# Patient Record
Sex: MALE | Race: WHITE | NOT HISPANIC OR LATINO | Employment: FULL TIME | URBAN - METROPOLITAN AREA
[De-identification: names, ages, dates, MRNs, and addresses within clinical notes are randomized per-mention and may not be internally consistent; named-entity substitution may affect disease eponyms.]

---

## 2022-10-20 ENCOUNTER — TELEPHONE (OUTPATIENT)
Dept: EMERGENCY MEDICINE | Facility: OTHER | Age: 42
End: 2022-10-20
Payer: COMMERCIAL

## 2022-10-20 ENCOUNTER — HOSPITAL ENCOUNTER (EMERGENCY)
Facility: OTHER | Age: 42
Discharge: HOME | End: 2022-10-20
Attending: EMERGENCY MEDICINE
Payer: COMMERCIAL

## 2022-10-20 VITALS
DIASTOLIC BLOOD PRESSURE: 79 MMHG | WEIGHT: 200 LBS | BODY MASS INDEX: 26.51 KG/M2 | HEART RATE: 64 BPM | OXYGEN SATURATION: 96 % | SYSTOLIC BLOOD PRESSURE: 134 MMHG | HEIGHT: 73 IN | RESPIRATION RATE: 18 BRPM | TEMPERATURE: 98 F

## 2022-10-20 DIAGNOSIS — R55 SYNCOPE: ICD-10-CM

## 2022-10-20 DIAGNOSIS — R11.2 NAUSEA AND VOMITING, UNSPECIFIED VOMITING TYPE: ICD-10-CM

## 2022-10-20 DIAGNOSIS — R10.31 RIGHT LOWER QUADRANT ABDOMINAL PAIN: Primary | ICD-10-CM

## 2022-10-20 LAB
ALBUMIN SERPL BCP-MCNC: 4.1 G/DL (ref 3.5–5.2)
ALP SERPL-CCNC: 69 U/L (ref 55–135)
ALT SERPL W/O P-5'-P-CCNC: 43 U/L (ref 10–44)
ANION GAP SERPL CALC-SCNC: 6 MMOL/L (ref 8–16)
AST SERPL-CCNC: 30 U/L (ref 10–40)
BASOPHILS # BLD AUTO: 0.03 K/UL (ref 0–0.2)
BASOPHILS NFR BLD: 0.5 % (ref 0–1.9)
BILIRUB SERPL-MCNC: 0.6 MG/DL (ref 0.1–1)
BILIRUB UR QL STRIP: NEGATIVE
BUN SERPL-MCNC: 17 MG/DL (ref 6–20)
CALCIUM SERPL-MCNC: 9 MG/DL (ref 8.7–10.5)
CHLORIDE SERPL-SCNC: 106 MMOL/L (ref 95–110)
CLARITY UR: CLEAR
CO2 SERPL-SCNC: 28 MMOL/L (ref 23–29)
COLOR UR: YELLOW
CREAT SERPL-MCNC: 1.2 MG/DL (ref 0.5–1.4)
DIFFERENTIAL METHOD: ABNORMAL
EOSINOPHIL # BLD AUTO: 0.1 K/UL (ref 0–0.5)
EOSINOPHIL NFR BLD: 1.8 % (ref 0–8)
ERYTHROCYTE [DISTWIDTH] IN BLOOD BY AUTOMATED COUNT: 12.2 % (ref 11.5–14.5)
EST. GFR  (NO RACE VARIABLE): >60 ML/MIN/1.73 M^2
GLUCOSE SERPL-MCNC: 128 MG/DL (ref 70–110)
GLUCOSE UR QL STRIP: NEGATIVE
HCT VFR BLD AUTO: 47.7 % (ref 40–54)
HGB BLD-MCNC: 16.3 G/DL (ref 14–18)
HGB UR QL STRIP: ABNORMAL
IMM GRANULOCYTES # BLD AUTO: 0.01 K/UL (ref 0–0.04)
IMM GRANULOCYTES NFR BLD AUTO: 0.2 % (ref 0–0.5)
KETONES UR QL STRIP: NEGATIVE
LEUKOCYTE ESTERASE UR QL STRIP: NEGATIVE
LIPASE SERPL-CCNC: 29 U/L (ref 4–60)
LYMPHOCYTES # BLD AUTO: 2.8 K/UL (ref 1–4.8)
LYMPHOCYTES NFR BLD: 43.6 % (ref 18–48)
MCH RBC QN AUTO: 30.6 PG (ref 27–31)
MCHC RBC AUTO-ENTMCNC: 34.2 G/DL (ref 32–36)
MCV RBC AUTO: 90 FL (ref 82–98)
MONOCYTES # BLD AUTO: 0.7 K/UL (ref 0.3–1)
MONOCYTES NFR BLD: 10.1 % (ref 4–15)
NEUTROPHILS # BLD AUTO: 2.9 K/UL (ref 1.8–7.7)
NEUTROPHILS NFR BLD: 43.8 % (ref 38–73)
NITRITE UR QL STRIP: NEGATIVE
NRBC BLD-RTO: 0 /100 WBC
PH UR STRIP: 7 [PH] (ref 5–8)
PLATELET # BLD AUTO: 283 K/UL (ref 150–450)
PMV BLD AUTO: 8.7 FL (ref 9.2–12.9)
POTASSIUM SERPL-SCNC: 3.7 MMOL/L (ref 3.5–5.1)
PROT SERPL-MCNC: 6.9 G/DL (ref 6–8.4)
PROT UR QL STRIP: NEGATIVE
RBC # BLD AUTO: 5.32 M/UL (ref 4.6–6.2)
SODIUM SERPL-SCNC: 140 MMOL/L (ref 136–145)
SP GR UR STRIP: 1.02 (ref 1–1.03)
URN SPEC COLLECT METH UR: ABNORMAL
UROBILINOGEN UR STRIP-ACNC: NEGATIVE EU/DL
WBC # BLD AUTO: 6.52 K/UL (ref 3.9–12.7)

## 2022-10-20 PROCEDURE — 96375 TX/PRO/DX INJ NEW DRUG ADDON: CPT

## 2022-10-20 PROCEDURE — 80053 COMPREHEN METABOLIC PANEL: CPT | Performed by: EMERGENCY MEDICINE

## 2022-10-20 PROCEDURE — 93010 EKG 12-LEAD: ICD-10-PCS | Mod: ,,, | Performed by: INTERNAL MEDICINE

## 2022-10-20 PROCEDURE — 25000003 PHARM REV CODE 250: Performed by: EMERGENCY MEDICINE

## 2022-10-20 PROCEDURE — 85025 COMPLETE CBC W/AUTO DIFF WBC: CPT | Performed by: EMERGENCY MEDICINE

## 2022-10-20 PROCEDURE — 93010 ELECTROCARDIOGRAM REPORT: CPT | Mod: ,,, | Performed by: INTERNAL MEDICINE

## 2022-10-20 PROCEDURE — 81003 URINALYSIS AUTO W/O SCOPE: CPT | Performed by: EMERGENCY MEDICINE

## 2022-10-20 PROCEDURE — 96374 THER/PROPH/DIAG INJ IV PUSH: CPT

## 2022-10-20 PROCEDURE — 99285 EMERGENCY DEPT VISIT HI MDM: CPT | Mod: 25

## 2022-10-20 PROCEDURE — 96361 HYDRATE IV INFUSION ADD-ON: CPT

## 2022-10-20 PROCEDURE — 63600175 PHARM REV CODE 636 W HCPCS: Performed by: EMERGENCY MEDICINE

## 2022-10-20 PROCEDURE — 93005 ELECTROCARDIOGRAM TRACING: CPT

## 2022-10-20 PROCEDURE — 83690 ASSAY OF LIPASE: CPT | Performed by: EMERGENCY MEDICINE

## 2022-10-20 RX ORDER — DICYCLOMINE HYDROCHLORIDE 10 MG/1
20 CAPSULE ORAL
Status: COMPLETED | OUTPATIENT
Start: 2022-10-20 | End: 2022-10-20

## 2022-10-20 RX ORDER — POLYETHYLENE GLYCOL 3350 17 G/17G
17 POWDER, FOR SOLUTION ORAL DAILY
Qty: 10 EACH | Refills: 0 | Status: SHIPPED | OUTPATIENT
Start: 2022-10-20 | End: 2024-02-14

## 2022-10-20 RX ORDER — DICYCLOMINE HYDROCHLORIDE 20 MG/1
20 TABLET ORAL 2 TIMES DAILY
Qty: 20 TABLET | Refills: 0 | Status: SHIPPED | OUTPATIENT
Start: 2022-10-20 | End: 2022-11-19

## 2022-10-20 RX ORDER — ONDANSETRON 4 MG/1
4 TABLET, FILM COATED ORAL EVERY 6 HOURS
Qty: 12 TABLET | Refills: 0 | Status: SHIPPED | OUTPATIENT
Start: 2022-10-20 | End: 2024-02-14

## 2022-10-20 RX ORDER — POLYETHYLENE GLYCOL 3350 17 G/17G
17 POWDER, FOR SOLUTION ORAL DAILY
Qty: 116 EACH | Refills: 0 | Status: SHIPPED | OUTPATIENT
Start: 2022-10-20 | End: 2024-02-14

## 2022-10-20 RX ORDER — MORPHINE SULFATE 2 MG/ML
2 INJECTION, SOLUTION INTRAMUSCULAR; INTRAVENOUS
Status: COMPLETED | OUTPATIENT
Start: 2022-10-20 | End: 2022-10-20

## 2022-10-20 RX ORDER — LIDOCAINE HYDROCHLORIDE 20 MG/ML
15 SOLUTION OROPHARYNGEAL ONCE
Status: COMPLETED | OUTPATIENT
Start: 2022-10-20 | End: 2022-10-20

## 2022-10-20 RX ORDER — ONDANSETRON 2 MG/ML
4 INJECTION INTRAMUSCULAR; INTRAVENOUS
Status: COMPLETED | OUTPATIENT
Start: 2022-10-20 | End: 2022-10-20

## 2022-10-20 RX ORDER — MAG HYDROX/ALUMINUM HYD/SIMETH 200-200-20
30 SUSPENSION, ORAL (FINAL DOSE FORM) ORAL ONCE
Status: COMPLETED | OUTPATIENT
Start: 2022-10-20 | End: 2022-10-20

## 2022-10-20 RX ADMIN — DICYCLOMINE HYDROCHLORIDE 20 MG: 10 CAPSULE ORAL at 06:10

## 2022-10-20 RX ADMIN — MORPHINE SULFATE 2 MG: 2 INJECTION, SOLUTION INTRAMUSCULAR; INTRAVENOUS at 05:10

## 2022-10-20 RX ADMIN — ONDANSETRON 4 MG: 2 INJECTION INTRAMUSCULAR; INTRAVENOUS at 05:10

## 2022-10-20 RX ADMIN — ALUMINUM HYDROXIDE, MAGNESIUM HYDROXIDE, AND SIMETHICONE 30 ML: 200; 200; 20 SUSPENSION ORAL at 06:10

## 2022-10-20 RX ADMIN — LIDOCAINE HYDROCHLORIDE 15 ML: 20 SOLUTION ORAL; TOPICAL at 06:10

## 2022-10-20 RX ADMIN — SODIUM CHLORIDE, SODIUM LACTATE, POTASSIUM CHLORIDE, AND CALCIUM CHLORIDE 1000 ML: .6; .31; .03; .02 INJECTION, SOLUTION INTRAVENOUS at 05:10

## 2022-10-20 NOTE — ED PROVIDER NOTES
Encounter Date: 10/20/2022       History     Chief Complaint   Patient presents with    Abdominal Pain     C/o right sided abdominal pain with nausea followed by a syncopal episode        Abdominal Pain  The current episode started just prior to arrival. The onset of the illness was abrupt. The problem has been gradually improving. The abdominal pain is located in the RLQ. The abdominal pain does not radiate. The abdominal pain is relieved by vomiting. The other symptoms of the illness include nausea and vomiting. The other symptoms of the illness do not include fever, diarrhea or dysuria.   The emesis contains stomach contents.   Symptoms associated with the illness do not include urgency, hematuria or back pain.   Review of patient's allergies indicates:   Allergen Reactions    Amoxicillin     Ampicillin     Penicillins      History reviewed. No pertinent past medical history.  History reviewed. No pertinent surgical history.  History reviewed. No pertinent family history.  Social History     Tobacco Use    Smoking status: Never    Smokeless tobacco: Never   Substance Use Topics    Alcohol use: Yes    Drug use: Never     Review of Systems   Constitutional:  Negative for fever.   Gastrointestinal:  Positive for abdominal pain, nausea and vomiting. Negative for diarrhea.   Genitourinary:  Negative for dysuria, hematuria and urgency.   Musculoskeletal:  Negative for back pain.   All other systems reviewed and are negative.    Physical Exam     Initial Vitals   BP Pulse Resp Temp SpO2   10/20/22 0531 10/20/22 0531 10/20/22 0530 10/20/22 0602 10/20/22 0530   118/71 (!) 54 15 98.2 °F (36.8 °C) 96 %      MAP       --                Physical Exam    Nursing note and vitals reviewed.  Constitutional: He appears well-developed and well-nourished.   HENT:   Head: Normocephalic.   Mouth/Throat: Oropharynx is clear and moist.   Eyes: Conjunctivae and EOM are normal.   Neck: Neck supple.   Normal range of  motion.  Cardiovascular:  Normal rate, regular rhythm and intact distal pulses.           Pulmonary/Chest: No respiratory distress.   Abdominal: Abdomen is soft. He exhibits no distension. There is no abdominal tenderness. There is no rebound and no guarding.   Musculoskeletal:         General: Normal range of motion.      Cervical back: Normal range of motion and neck supple.     Neurological: He is alert and oriented to person, place, and time. He has normal strength.   Skin: Skin is warm and dry. Capillary refill takes less than 2 seconds. No rash noted.   Psychiatric: He has a normal mood and affect.       ED Course   Procedures  Labs Reviewed   CBC W/ AUTO DIFFERENTIAL - Abnormal; Notable for the following components:       Result Value    MPV 8.7 (*)     All other components within normal limits   COMPREHENSIVE METABOLIC PANEL - Abnormal; Notable for the following components:    Glucose 128 (*)     Anion Gap 6 (*)     All other components within normal limits   URINALYSIS, REFLEX TO URINE CULTURE - Abnormal; Notable for the following components:    Occult Blood UA Trace (*)     All other components within normal limits    Narrative:     Specimen Source->Urine   LIPASE        ECG Results              EKG 12-lead (In process)  Result time 10/20/22 07:03:34      In process by Interface, Lab In [facility] (10/20/22 07:03:34)                   Narrative:    Test Reason : R55,    Vent. Rate : 058 BPM     Atrial Rate : 058 BPM     P-R Int : 158 ms          QRS Dur : 124 ms      QT Int : 424 ms       P-R-T Axes : 032 032 027 degrees     QTc Int : 416 ms    Sinus bradycardia  Right bundle branch block  Abnormal ECG      Referred By: [email or web address]   SELF           Confirmed By:                                   Imaging Results              CT Abdomen Pelvis  Without Contrast (Final result)  Result time 10/20/22 06:14:20      Final result by Francesco Nieves MD (10/20/22 06:14:20)                   Impression:      1. No  definite acute findings identified in the abdomen or pelvis by unenhanced CT technique to account for the patient's reported symptoms.  2. Additional details, as provided in the body of report.      Electronically signed by: Francesco Nieves  Date:    10/20/2022  Time:    06:14               Narrative:    EXAMINATION:  CT ABDOMEN PELVIS WITHOUT CONTRAST    CLINICAL HISTORY:  RLQ abdominal pain (Age >= 14y);    TECHNIQUE:  Low dose axial images, sagittal and coronal reformations were obtained from the lung bases to the pubic symphysis.    COMPARISON:  None    FINDINGS:  This examination is limited due to lack of intravenous contrast.    Lower chest: Minor dependent atelectasis at the lung bases.    Liver: Normal contour.    Gallbladder and bile ducts: Unremarkable.    Pancreas: Normal contour.    Spleen: Normal contour.    Adrenals: Normal contour.    Kidneys: Simple cyst lower pole right kidney.  Additional subcentimeter hypoattenuating lesions too small to characterize.    Lymph nodes: No abdominal or pelvic lymphadenopathy.    Bowel and mesentery: No evidence of bowel obstruction.  Normal caliber appendix.    Abdominal aorta: Unremarkable.    Inferior vena cava: Unremarkable.    Free fluid or free air: None.    Pelvis: Unremarkable.    Urinary bladder: Unremarkable.    Body wall: Small fat containing umbilical hernia.    Bones: Unremarkable.                                       Medications   lactated ringers bolus 1,000 mL (0 mLs Intravenous Stopped 10/20/22 0641)   ondansetron injection 4 mg (4 mg Intravenous Given 10/20/22 0549)   morphine injection 2 mg (2 mg Intravenous Given 10/20/22 0549)   aluminum-magnesium hydroxide-simethicone 200-200-20 mg/5 mL suspension 30 mL (30 mLs Oral Given 10/20/22 0630)     And   LIDOcaine HCl 2% oral solution 15 mL (15 mLs Oral Given 10/20/22 0630)   dicyclomine capsule 20 mg (20 mg Oral Given 10/20/22 0630)     Medical Decision Making:   Clinical Tests:   Lab Tests: Ordered and  Reviewed  Radiological Study: Ordered and Reviewed  ED Management:  Workup reassuring discussed symptomatic care and ED return precautions                         Clinical Impression:   Final diagnoses:  [R55] Syncope  [R10.31] Right lower quadrant abdominal pain (Primary)  [R11.2] Nausea and vomiting, unspecified vomiting type        ED Disposition Condition    Discharge Stable          ED Prescriptions       Medication Sig Dispense Start Date End Date Auth. Provider    ondansetron (ZOFRAN) 4 MG tablet Take 1 tablet (4 mg total) by mouth every 6 (six) hours. 12 tablet 10/20/2022 -- Guy J. Lefort, MD    dicyclomine (BENTYL) 20 mg tablet Take 1 tablet (20 mg total) by mouth 2 (two) times daily. 20 tablet 10/20/2022 11/19/2022 Guy J. Lefort, MD    polyethylene glycol (GLYCOLAX) 17 gram/dose powder Take 17 g by mouth once daily. 116 each 10/20/2022 -- Guy J. Lefort, MD          Follow-up Information       Follow up With Specialties Details Why Contact Info    [facility] - Emergency Dept Emergency Medicine  If symptoms worsen or any other concerns [address]  [facility] [address] [phone]             Guy J. Lefort, MD  10/20/22 3390

## 2023-06-15 ENCOUNTER — TELEPHONE (OUTPATIENT)
Dept: OTOLARYNGOLOGY | Facility: CLINIC | Age: 43
End: 2023-06-15
Payer: COMMERCIAL

## 2023-06-21 ENCOUNTER — OFFICE VISIT (OUTPATIENT)
Dept: FAMILY MEDICINE | Facility: CLINIC | Age: 43
End: 2023-06-21
Payer: COMMERCIAL

## 2023-06-21 VITALS
OXYGEN SATURATION: 97 % | WEIGHT: 198.44 LBS | HEIGHT: 73 IN | SYSTOLIC BLOOD PRESSURE: 122 MMHG | BODY MASS INDEX: 26.3 KG/M2 | HEART RATE: 63 BPM | DIASTOLIC BLOOD PRESSURE: 86 MMHG

## 2023-06-21 DIAGNOSIS — Z11.59 ENCOUNTER FOR HEPATITIS C SCREENING TEST FOR LOW RISK PATIENT: ICD-10-CM

## 2023-06-21 DIAGNOSIS — Z11.4 SCREENING FOR HIV (HUMAN IMMUNODEFICIENCY VIRUS): ICD-10-CM

## 2023-06-21 DIAGNOSIS — Z02.89 ENCOUNTER FOR COMPLETION OF FORM WITH PATIENT: ICD-10-CM

## 2023-06-21 DIAGNOSIS — Z00.00 ANNUAL PHYSICAL EXAM: Primary | ICD-10-CM

## 2023-06-21 PROCEDURE — 3008F PR BODY MASS INDEX (BMI) DOCUMENTED: ICD-10-PCS | Mod: CPTII,S$GLB,, | Performed by: FAMILY MEDICINE

## 2023-06-21 PROCEDURE — 3008F BODY MASS INDEX DOCD: CPT | Mod: CPTII,S$GLB,, | Performed by: FAMILY MEDICINE

## 2023-06-21 PROCEDURE — 99386 PR PREVENTIVE VISIT,NEW,40-64: ICD-10-PCS | Mod: S$GLB,,, | Performed by: FAMILY MEDICINE

## 2023-06-21 PROCEDURE — 99386 PREV VISIT NEW AGE 40-64: CPT | Mod: S$GLB,,, | Performed by: FAMILY MEDICINE

## 2023-06-21 PROCEDURE — 3074F PR MOST RECENT SYSTOLIC BLOOD PRESSURE < 130 MM HG: ICD-10-PCS | Mod: CPTII,S$GLB,, | Performed by: FAMILY MEDICINE

## 2023-06-21 PROCEDURE — 3079F PR MOST RECENT DIASTOLIC BLOOD PRESSURE 80-89 MM HG: ICD-10-PCS | Mod: CPTII,S$GLB,, | Performed by: FAMILY MEDICINE

## 2023-06-21 PROCEDURE — 99999 PR PBB SHADOW E&M-EST. PATIENT-LVL III: CPT | Mod: PBBFAC,,, | Performed by: FAMILY MEDICINE

## 2023-06-21 PROCEDURE — 99999 PR PBB SHADOW E&M-EST. PATIENT-LVL III: ICD-10-PCS | Mod: PBBFAC,,, | Performed by: FAMILY MEDICINE

## 2023-06-21 PROCEDURE — 1159F MED LIST DOCD IN RCRD: CPT | Mod: CPTII,S$GLB,, | Performed by: FAMILY MEDICINE

## 2023-06-21 PROCEDURE — 3074F SYST BP LT 130 MM HG: CPT | Mod: CPTII,S$GLB,, | Performed by: FAMILY MEDICINE

## 2023-06-21 PROCEDURE — 1159F PR MEDICATION LIST DOCUMENTED IN MEDICAL RECORD: ICD-10-PCS | Mod: CPTII,S$GLB,, | Performed by: FAMILY MEDICINE

## 2023-06-21 PROCEDURE — 3079F DIAST BP 80-89 MM HG: CPT | Mod: CPTII,S$GLB,, | Performed by: FAMILY MEDICINE

## 2023-06-24 ENCOUNTER — LAB VISIT (OUTPATIENT)
Dept: LAB | Facility: HOSPITAL | Age: 43
End: 2023-06-24
Attending: FAMILY MEDICINE
Payer: COMMERCIAL

## 2023-06-24 DIAGNOSIS — Z11.4 SCREENING FOR HIV (HUMAN IMMUNODEFICIENCY VIRUS): ICD-10-CM

## 2023-06-24 DIAGNOSIS — Z00.00 ANNUAL PHYSICAL EXAM: ICD-10-CM

## 2023-06-24 DIAGNOSIS — Z11.59 ENCOUNTER FOR HEPATITIS C SCREENING TEST FOR LOW RISK PATIENT: ICD-10-CM

## 2023-06-24 LAB
ALBUMIN SERPL BCP-MCNC: 4.3 G/DL (ref 3.5–5.2)
ALP SERPL-CCNC: 59 U/L (ref 55–135)
ALT SERPL W/O P-5'-P-CCNC: 20 U/L (ref 10–44)
ANION GAP SERPL CALC-SCNC: 10 MMOL/L (ref 8–16)
AST SERPL-CCNC: 23 U/L (ref 10–40)
BASOPHILS # BLD AUTO: 0.05 K/UL (ref 0–0.2)
BASOPHILS NFR BLD: 1 % (ref 0–1.9)
BILIRUB SERPL-MCNC: 0.6 MG/DL (ref 0.1–1)
BUN SERPL-MCNC: 17 MG/DL (ref 6–20)
CALCIUM SERPL-MCNC: 9.4 MG/DL (ref 8.7–10.5)
CHLORIDE SERPL-SCNC: 108 MMOL/L (ref 95–110)
CHOLEST SERPL-MCNC: 140 MG/DL (ref 120–199)
CHOLEST/HDLC SERPL: 3.4 {RATIO} (ref 2–5)
CO2 SERPL-SCNC: 23 MMOL/L (ref 23–29)
CREAT SERPL-MCNC: 1 MG/DL (ref 0.5–1.4)
DIFFERENTIAL METHOD: ABNORMAL
EOSINOPHIL # BLD AUTO: 0.1 K/UL (ref 0–0.5)
EOSINOPHIL NFR BLD: 1.6 % (ref 0–8)
ERYTHROCYTE [DISTWIDTH] IN BLOOD BY AUTOMATED COUNT: 12.7 % (ref 11.5–14.5)
EST. GFR  (NO RACE VARIABLE): >60 ML/MIN/1.73 M^2
ESTIMATED AVG GLUCOSE: 100 MG/DL (ref 68–131)
GLUCOSE SERPL-MCNC: 92 MG/DL (ref 70–110)
HBA1C MFR BLD: 5.1 % (ref 4–5.6)
HCT VFR BLD AUTO: 47.7 % (ref 40–54)
HCV AB SERPL QL IA: NORMAL
HDLC SERPL-MCNC: 41 MG/DL (ref 40–75)
HDLC SERPL: 29.3 % (ref 20–50)
HGB BLD-MCNC: 16.4 G/DL (ref 14–18)
HIV 1+2 AB+HIV1 P24 AG SERPL QL IA: NORMAL
IMM GRANULOCYTES # BLD AUTO: 0.01 K/UL (ref 0–0.04)
IMM GRANULOCYTES NFR BLD AUTO: 0.2 % (ref 0–0.5)
LDLC SERPL CALC-MCNC: 80.8 MG/DL (ref 63–159)
LYMPHOCYTES # BLD AUTO: 1.6 K/UL (ref 1–4.8)
LYMPHOCYTES NFR BLD: 32.9 % (ref 18–48)
MCH RBC QN AUTO: 30.1 PG (ref 27–31)
MCHC RBC AUTO-ENTMCNC: 34.4 G/DL (ref 32–36)
MCV RBC AUTO: 88 FL (ref 82–98)
MONOCYTES # BLD AUTO: 0.6 K/UL (ref 0.3–1)
MONOCYTES NFR BLD: 11.2 % (ref 4–15)
NEUTROPHILS # BLD AUTO: 2.6 K/UL (ref 1.8–7.7)
NEUTROPHILS NFR BLD: 53.1 % (ref 38–73)
NONHDLC SERPL-MCNC: 99 MG/DL
NRBC BLD-RTO: 0 /100 WBC
PLATELET # BLD AUTO: 292 K/UL (ref 150–450)
PMV BLD AUTO: 8.7 FL (ref 9.2–12.9)
POTASSIUM SERPL-SCNC: 3.8 MMOL/L (ref 3.5–5.1)
PROT SERPL-MCNC: 7.1 G/DL (ref 6–8.4)
RBC # BLD AUTO: 5.45 M/UL (ref 4.6–6.2)
SODIUM SERPL-SCNC: 141 MMOL/L (ref 136–145)
TRIGL SERPL-MCNC: 91 MG/DL (ref 30–150)
TSH SERPL DL<=0.005 MIU/L-ACNC: 2.39 UIU/ML (ref 0.4–4)
WBC # BLD AUTO: 4.92 K/UL (ref 3.9–12.7)

## 2023-06-24 PROCEDURE — 36415 COLL VENOUS BLD VENIPUNCTURE: CPT | Performed by: FAMILY MEDICINE

## 2023-06-24 PROCEDURE — 83036 HEMOGLOBIN GLYCOSYLATED A1C: CPT | Performed by: FAMILY MEDICINE

## 2023-06-24 PROCEDURE — 86803 HEPATITIS C AB TEST: CPT | Performed by: FAMILY MEDICINE

## 2023-06-24 PROCEDURE — 85025 COMPLETE CBC W/AUTO DIFF WBC: CPT | Performed by: FAMILY MEDICINE

## 2023-06-24 PROCEDURE — 84443 ASSAY THYROID STIM HORMONE: CPT | Performed by: FAMILY MEDICINE

## 2023-06-24 PROCEDURE — 80061 LIPID PANEL: CPT | Performed by: FAMILY MEDICINE

## 2023-06-24 PROCEDURE — 80053 COMPREHEN METABOLIC PANEL: CPT | Performed by: FAMILY MEDICINE

## 2023-06-24 PROCEDURE — 87389 HIV-1 AG W/HIV-1&-2 AB AG IA: CPT | Performed by: FAMILY MEDICINE

## 2023-06-26 ENCOUNTER — TELEPHONE (OUTPATIENT)
Dept: DERMATOLOGY | Facility: CLINIC | Age: 43
End: 2023-06-26
Payer: COMMERCIAL

## 2023-06-26 NOTE — TELEPHONE ENCOUNTER
----- Message from Velvet Hernandez MA sent at 6/19/2023  5:02 PM CDT -----  Contact: @ [phone]`    ----- Message -----  From: Miriam Jenkins  Sent: 6/19/2023   3:45 PM CDT  To: [facility] Clinical Support Triage    Pt requesting a appointment for the following new pt/prescription refill //hair shampoo...Please call and adv @ [phone]`

## 2023-06-27 ENCOUNTER — OFFICE VISIT (OUTPATIENT)
Dept: DERMATOLOGY | Facility: CLINIC | Age: 43
End: 2023-06-27
Payer: COMMERCIAL

## 2023-06-27 DIAGNOSIS — L50.3 DERMATOGRAPHISM: Primary | ICD-10-CM

## 2023-06-27 DIAGNOSIS — L71.9 ROSACEA: ICD-10-CM

## 2023-06-27 DIAGNOSIS — L21.9 SEBORRHEIC DERMATITIS: ICD-10-CM

## 2023-06-27 DIAGNOSIS — L70.9 ACNE, UNSPECIFIED ACNE TYPE: ICD-10-CM

## 2023-06-27 DIAGNOSIS — L73.1 PSEUDOFOLLICULITIS BARBAE: ICD-10-CM

## 2023-06-27 PROCEDURE — 1159F MED LIST DOCD IN RCRD: CPT | Mod: CPTII,S$GLB,, | Performed by: STUDENT IN AN ORGANIZED HEALTH CARE EDUCATION/TRAINING PROGRAM

## 2023-06-27 PROCEDURE — 99999 PR PBB SHADOW E&M-EST. PATIENT-LVL II: ICD-10-PCS | Mod: PBBFAC,,, | Performed by: STUDENT IN AN ORGANIZED HEALTH CARE EDUCATION/TRAINING PROGRAM

## 2023-06-27 PROCEDURE — 1160F RVW MEDS BY RX/DR IN RCRD: CPT | Mod: CPTII,S$GLB,, | Performed by: STUDENT IN AN ORGANIZED HEALTH CARE EDUCATION/TRAINING PROGRAM

## 2023-06-27 PROCEDURE — 3044F HG A1C LEVEL LT 7.0%: CPT | Mod: CPTII,S$GLB,, | Performed by: STUDENT IN AN ORGANIZED HEALTH CARE EDUCATION/TRAINING PROGRAM

## 2023-06-27 PROCEDURE — 3044F PR MOST RECENT HEMOGLOBIN A1C LEVEL <7.0%: ICD-10-PCS | Mod: CPTII,S$GLB,, | Performed by: STUDENT IN AN ORGANIZED HEALTH CARE EDUCATION/TRAINING PROGRAM

## 2023-06-27 PROCEDURE — 99204 OFFICE O/P NEW MOD 45 MIN: CPT | Mod: S$GLB,,, | Performed by: STUDENT IN AN ORGANIZED HEALTH CARE EDUCATION/TRAINING PROGRAM

## 2023-06-27 PROCEDURE — 99204 PR OFFICE/OUTPT VISIT, NEW, LEVL IV, 45-59 MIN: ICD-10-PCS | Mod: S$GLB,,, | Performed by: STUDENT IN AN ORGANIZED HEALTH CARE EDUCATION/TRAINING PROGRAM

## 2023-06-27 PROCEDURE — 1160F PR REVIEW ALL MEDS BY PRESCRIBER/CLIN PHARMACIST DOCUMENTED: ICD-10-PCS | Mod: CPTII,S$GLB,, | Performed by: STUDENT IN AN ORGANIZED HEALTH CARE EDUCATION/TRAINING PROGRAM

## 2023-06-27 PROCEDURE — 1159F PR MEDICATION LIST DOCUMENTED IN MEDICAL RECORD: ICD-10-PCS | Mod: CPTII,S$GLB,, | Performed by: STUDENT IN AN ORGANIZED HEALTH CARE EDUCATION/TRAINING PROGRAM

## 2023-06-27 PROCEDURE — 99999 PR PBB SHADOW E&M-EST. PATIENT-LVL II: CPT | Mod: PBBFAC,,, | Performed by: STUDENT IN AN ORGANIZED HEALTH CARE EDUCATION/TRAINING PROGRAM

## 2023-06-27 RX ORDER — KETOCONAZOLE 20 MG/ML
SHAMPOO, SUSPENSION TOPICAL
Qty: 240 ML | Refills: 6 | Status: SHIPPED | OUTPATIENT
Start: 2023-06-28 | End: 2024-02-14

## 2023-06-27 RX ORDER — METRONIDAZOLE 7.5 MG/G
GEL TOPICAL 2 TIMES DAILY
Qty: 45 G | Refills: 6 | Status: SHIPPED | OUTPATIENT
Start: 2023-06-27 | End: 2023-12-21 | Stop reason: SDUPTHER

## 2023-06-27 RX ORDER — TRETINOIN AND BENZOYL PEROXIDE 30; 1 MG/G; MG/G
1 CREAM TOPICAL NIGHTLY
Qty: 30 G | Refills: 3 | Status: SHIPPED | OUTPATIENT
Start: 2023-06-27

## 2023-06-27 NOTE — PATIENT INSTRUCTIONS

## 2023-06-27 NOTE — PROGRESS NOTES
Subjective:      Patient ID:  Santy Angeles is a 43 y.o. male who presents for   Chief Complaint   Patient presents with    Dry Skin     Dry Skin - Initial  Affected locations: would like Rx refilled (metronidazole 0.75% and hydroxyzine)    Has history of getting pimples on scalp. Uses ketoconazole shampoo which helps  Also gets pimples on face, using metronidazole, which controls it and he would like a refill  He also has dermatographism and takes benadryl prn    Also gets pimples in beard when he shaves    Review of Systems   Skin:  Positive for dry skin.     Objective:   Physical Exam   Constitutional: He appears well-developed and well-nourished. No distress.   Neurological: He is alert and oriented to person, place, and time. He is not disoriented.   Psychiatric: He has a normal mood and affect.   Skin:   Areas Examined (abnormalities noted in diagram):   Scalp / Hair Palpated and Inspected  Head / Face Inspection Performed          Diagram Legend     Erythematous scaling macule/papule c/w actinic keratosis       Vascular papule c/w angioma      Pigmented verrucoid papule/plaque c/w seborrheic keratosis      Yellow umbilicated papule c/w sebaceous hyperplasia      Irregularly shaped tan macule c/w lentigo     1-2 mm smooth white papules consistent with Milia      Movable subcutaneous cyst with punctum c/w epidermal inclusion cyst      Subcutaneous movable cyst c/w pilar cyst      Firm pink to brown papule c/w dermatofibroma      Pedunculated fleshy papule(s) c/w skin tag(s)      Evenly pigmented macule c/w junctional nevus     Mildly variegated pigmented, slightly irregular-bordered macule c/w mildly atypical nevus      Flesh colored to evenly pigmented papule c/w intradermal nevus       Pink pearly papule/plaque c/w basal cell carcinoma      Erythematous hyperkeratotic cursted plaque c/w SCC      Surgical scar with no sign of skin cancer recurrence      Open and closed comedones      Inflammatory papules and  pustules      Verrucoid papule consistent consistent with wart     Erythematous eczematous patches and plaques     Dystrophic onycholytic nail with subungual debris c/w onychomycosis     Umbilicated papule    Erythematous-base heme-crusted tan verrucoid plaque consistent with inflamed seborrheic keratosis     Erythematous Silvery Scaling Plaque c/w Psoriasis     See annotation      Assessment / Plan:        Dermatographism  - can take OTC allegra or zyrtec 24 hr PRN itching    Patient has some features of seb derm, rosacea, acne, PFB. He is already using keto shampoo and metro gel which he feels helps pimples across his scalp and forehead. He also gets follicular papules / pustules in beard area that is worse with shaving. Likely a component of PFB  - WIll refill metrogel and keto shampoo as he feels they are helping  - Will add twyneo, which can treat acne and PFB. Microencapsulated BP can also treat rosacea  - can maintain short beard and avoid close shaving as it can flare PFB    -     metroNIDAZOLE (METROGEL) 0.75 % gel; Apply topically 2 (two) times daily.  Dispense: 45 g; Refill: 6  -     ketoconazole (NIZORAL) 2 % shampoo; Apply topically 3 (three) times a week. Leave on for about 5-10 minutes then wash off  Dispense: 240 mL; Refill: 6  -     tretinoin-benzoyl peroxide (TWYNEO) 0.1-3 % Crea; Apply 1 application topically every evening. Start 2-3 times weekly then increase up to every night after 2-3 weeks if skin is not too dry. Apply pea sized amount to entire face  Dispense: 30 g; Refill: 3    Patient also reports h/o skin cancer x 2. Will f/u for TBSE         Follow up in about 3 months (around 9/27/2023) for TBSE.

## 2023-07-03 ENCOUNTER — TELEPHONE (OUTPATIENT)
Dept: FAMILY MEDICINE | Facility: CLINIC | Age: 43
End: 2023-07-03
Payer: COMMERCIAL

## 2023-07-03 NOTE — TELEPHONE ENCOUNTER
Returned call.  Did not have anything available today.  Advised to try urgent care.  Also offered to est care with PCP here in our office.  Patient stated he has an appointment at another facility but will contact us if he wishes to est.

## 2023-07-03 NOTE — TELEPHONE ENCOUNTER
----- Message from Taty Slaughter sent at 7/1/2023 10:11 AM CDT -----  Type:  Same Day Appointment Request    Caller is requesting a same day appointment.  Caller declined first available appointment listed below.    Name of Caller: pt  When is the first available appointment?  Symptoms: insomnia for about a week  Best Call Back Number: [phone]  Additional Information:  pt said he wants to be seen on Monday; pt requested that message be sent to all [facility]'s primary care physicians

## 2023-07-03 NOTE — TELEPHONE ENCOUNTER
----- Message from Marcelo Pino sent at 7/3/2023 11:11 AM CDT -----  .Type:  Patient Returning Call    Who Called:Pt  Who Left Message for Patient:Caren Monte  Does the patient know what this is regarding?:yes  Would the patient rather a call back or a response via [facility]? call  Best Call Back Number:[phone]  Additional Information:

## 2023-07-05 ENCOUNTER — OFFICE VISIT (OUTPATIENT)
Dept: DERMATOLOGY | Facility: CLINIC | Age: 43
End: 2023-07-05
Payer: COMMERCIAL

## 2023-07-05 DIAGNOSIS — D22.9 MULTIPLE BENIGN NEVI: ICD-10-CM

## 2023-07-05 DIAGNOSIS — D18.01 CHERRY ANGIOMA: ICD-10-CM

## 2023-07-05 DIAGNOSIS — L21.9 SEBORRHEIC DERMATITIS: Primary | ICD-10-CM

## 2023-07-05 DIAGNOSIS — L82.1 SEBORRHEIC KERATOSES: ICD-10-CM

## 2023-07-05 DIAGNOSIS — D23.9 DERMATOFIBROMA: ICD-10-CM

## 2023-07-05 DIAGNOSIS — Z12.83 SCREENING EXAM FOR SKIN CANCER: ICD-10-CM

## 2023-07-05 DIAGNOSIS — L81.4 LENTIGO: ICD-10-CM

## 2023-07-05 PROCEDURE — 99999 PR PBB SHADOW E&M-EST. PATIENT-LVL III: ICD-10-PCS | Mod: PBBFAC,,, | Performed by: STUDENT IN AN ORGANIZED HEALTH CARE EDUCATION/TRAINING PROGRAM

## 2023-07-05 PROCEDURE — 3044F HG A1C LEVEL LT 7.0%: CPT | Mod: CPTII,S$GLB,, | Performed by: STUDENT IN AN ORGANIZED HEALTH CARE EDUCATION/TRAINING PROGRAM

## 2023-07-05 PROCEDURE — 1159F PR MEDICATION LIST DOCUMENTED IN MEDICAL RECORD: ICD-10-PCS | Mod: CPTII,S$GLB,, | Performed by: STUDENT IN AN ORGANIZED HEALTH CARE EDUCATION/TRAINING PROGRAM

## 2023-07-05 PROCEDURE — 99213 OFFICE O/P EST LOW 20 MIN: CPT | Mod: S$GLB,,, | Performed by: STUDENT IN AN ORGANIZED HEALTH CARE EDUCATION/TRAINING PROGRAM

## 2023-07-05 PROCEDURE — 1160F RVW MEDS BY RX/DR IN RCRD: CPT | Mod: CPTII,S$GLB,, | Performed by: STUDENT IN AN ORGANIZED HEALTH CARE EDUCATION/TRAINING PROGRAM

## 2023-07-05 PROCEDURE — 3044F PR MOST RECENT HEMOGLOBIN A1C LEVEL <7.0%: ICD-10-PCS | Mod: CPTII,S$GLB,, | Performed by: STUDENT IN AN ORGANIZED HEALTH CARE EDUCATION/TRAINING PROGRAM

## 2023-07-05 PROCEDURE — 99999 PR PBB SHADOW E&M-EST. PATIENT-LVL III: CPT | Mod: PBBFAC,,, | Performed by: STUDENT IN AN ORGANIZED HEALTH CARE EDUCATION/TRAINING PROGRAM

## 2023-07-05 PROCEDURE — 99213 PR OFFICE/OUTPT VISIT, EST, LEVL III, 20-29 MIN: ICD-10-PCS | Mod: S$GLB,,, | Performed by: STUDENT IN AN ORGANIZED HEALTH CARE EDUCATION/TRAINING PROGRAM

## 2023-07-05 PROCEDURE — 1159F MED LIST DOCD IN RCRD: CPT | Mod: CPTII,S$GLB,, | Performed by: STUDENT IN AN ORGANIZED HEALTH CARE EDUCATION/TRAINING PROGRAM

## 2023-07-05 PROCEDURE — 1160F PR REVIEW ALL MEDS BY PRESCRIBER/CLIN PHARMACIST DOCUMENTED: ICD-10-PCS | Mod: CPTII,S$GLB,, | Performed by: STUDENT IN AN ORGANIZED HEALTH CARE EDUCATION/TRAINING PROGRAM

## 2023-07-05 RX ORDER — SELENIUM SULFIDE 2.5 MG/100ML
1 LOTION TOPICAL DAILY
Qty: 118 ML | Refills: 10 | Status: SHIPPED | OUTPATIENT
Start: 2023-07-05 | End: 2023-12-19 | Stop reason: SDUPTHER

## 2023-07-05 NOTE — PROGRESS NOTES
Subjective:      Patient ID:  Santy Angeles is a 43 y.o. male who presents for   Chief Complaint   Patient presents with    Skin Check     History of Present Illness: The patient presents for follow up.    The patient was last seen on: 6/27/2023 for dermatographism. Pt using medications as prescribed except twenyo.    Other skin complaints: none      Seen 6/27/23 for seb derm / rosacea vs acne. He had previously been using metronidazole and selenium sulfide 2.5% shampoo, which was helping. Rx sent for metronidazole 0.75% gel, keto shampoo and twyneo. He has not yet received the twyneo. 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 No distress.   Neurological: He is alert and oriented to person, place, and time. He is not disoriented.   Psychiatric: He has a normal mood and affect.   Skin:   Areas Examined (abnormalities noted in diagram):   Scalp / Hair Palpated and Inspected  Head / Face Inspection Performed  Neck Inspection Performed  Chest / Axilla Inspection Performed  Abdomen Inspection Performed  Back Inspection Performed  RUE Inspected  LUE Inspection Performed  RLE Inspected  LLE Inspection Performed  Nails and Digits Inspection Performed               Diagram Legend     Erythematous scaling macule/papule c/w actinic keratosis       Vascular papule c/w angioma      Pigmented verrucoid papule/plaque c/w seborrheic keratosis      Yellow umbilicated papule c/w sebaceous hyperplasia      Irregularly shaped tan macule c/w lentigo     1-2 mm smooth white papules consistent with Milia      Movable subcutaneous cyst with punctum c/w epidermal inclusion cyst      Subcutaneous movable cyst c/w pilar cyst      Firm pink to brown papule c/w dermatofibroma      Pedunculated fleshy papule(s) c/w skin tag(s)       Evenly pigmented macule c/w junctional nevus     Mildly variegated pigmented, slightly irregular-bordered macule c/w mildly atypical nevus      Flesh colored to evenly pigmented papule c/w intradermal nevus       Pink pearly papule/plaque c/w basal cell carcinoma      Erythematous hyperkeratotic cursted plaque c/w SCC      Surgical scar with no sign of skin cancer recurrence      Open and closed comedones      Inflammatory papules and pustules      Verrucoid papule consistent consistent with wart     Erythematous eczematous patches and plaques     Dystrophic onycholytic nail with subungual debris c/w onychomycosis     Umbilicated papule    Erythematous-base heme-crusted tan verrucoid plaque consistent with inflamed seborrheic keratosis     Erythematous Silvery Scaling Plaque c/w Psoriasis     See annotation      Assessment / Plan:        Seborrheic dermatitis  -     selenium sulfide 2.5 % Lotn; Apply 1 application topically once daily.  Dispense: 118 mL; Refill: 10    Screening exam for skin cancer  Area of previous SCC examined. Site well healed with no signs of recurrence.    Total body skin examination performed today including at least 12 points as noted in physical examination. No lesions suspicious for malignancy noted.    Recommend daily sun protection/avoidance, use of at least SPF 30, broad spectrum sunscreen (OTC drug), skin self examinations, and routine physician surveillance to optimize early detection    Seborrheic keratoses  Cherry angioma  Lentigo  Multiple benign nevi  Dermatofibroma  Reassurance given to patient. No treatment is necessary.   Treatment of benign, asymptomatic lesions may be considered cosmetic.         Follow up in about 1 year (around 7/5/2024) for TBSE.

## 2023-12-19 DIAGNOSIS — L21.9 SEBORRHEIC DERMATITIS: ICD-10-CM

## 2023-12-19 DIAGNOSIS — L71.9 ROSACEA: ICD-10-CM

## 2023-12-21 DIAGNOSIS — L71.9 ROSACEA: ICD-10-CM

## 2023-12-21 RX ORDER — METRONIDAZOLE 7.5 MG/G
GEL TOPICAL 2 TIMES DAILY
Qty: 45 G | Refills: 6 | Status: SHIPPED | OUTPATIENT
Start: 2023-12-21 | End: 2024-12-20

## 2023-12-26 RX ORDER — SELENIUM SULFIDE 2.5 MG/100ML
1 LOTION TOPICAL DAILY
Qty: 118 ML | Refills: 10 | Status: SHIPPED | OUTPATIENT
Start: 2023-12-26

## 2023-12-26 RX ORDER — METRONIDAZOLE 7.5 MG/G
GEL TOPICAL 2 TIMES DAILY
Qty: 45 G | Refills: 6 | Status: SHIPPED | OUTPATIENT
Start: 2023-12-26 | End: 2024-12-25

## 2023-12-26 NOTE — TELEPHONE ENCOUNTER
Encounter Date: 06/27/2023     Dermatographism  - can take OTC allegra or zyrtec 24 hr PRN itching     Patient has some features of seb derm, rosacea, acne, PFB. He is already using keto shampoo and metro gel which he feels helps pimples across his scalp and forehead. He also gets follicular papules / pustules in beard area that is worse with shaving. Likely a component of PFB  - WIll refill metrogel and keto shampoo as he feels they are helping  - Will add twyneo, which can treat acne and PFB. Microencapsulated BP can also treat rosacea  - can maintain short beard and avoid close shaving as it can flare PFB     -     metroNIDAZOLE (METROGEL) 0.75 % gel; Apply topically 2 (two) times daily.  Dispense: 45 g; Refill: 6  -     ketoconazole (NIZORAL) 2 % shampoo; Apply topically 3 (three) times a week. Leave on for about 5-10 minutes then wash off  Dispense: 240 mL; Refill: 6  -     tretinoin-benzoyl peroxide (TWYNEO) 0.1-3 % Crea; Apply 1 application topically every evening. Start 2-3 times weekly then increase up to every night after 2-3 weeks if skin is not too dry. Apply pea sized amount to entire face  Dispense: 30 g; Refill: 3

## 2024-02-02 ENCOUNTER — OFFICE VISIT (OUTPATIENT)
Dept: UROLOGY | Facility: CLINIC | Age: 44
End: 2024-02-02
Payer: COMMERCIAL

## 2024-02-02 VITALS — HEART RATE: 62 BPM | SYSTOLIC BLOOD PRESSURE: 124 MMHG | DIASTOLIC BLOOD PRESSURE: 79 MMHG

## 2024-02-02 DIAGNOSIS — N50.812 PAIN IN LEFT TESTICLE: Primary | ICD-10-CM

## 2024-02-02 DIAGNOSIS — E29.1 HYPOGONADISM MALE: ICD-10-CM

## 2024-02-02 LAB
BILIRUB SERPL-MCNC: NEGATIVE MG/DL
BLOOD URINE, POC: NEGATIVE
CLARITY, POC UA: CLEAR
COLOR, POC UA: YELLOW
GLUCOSE UR QL STRIP: NEGATIVE
KETONES UR QL STRIP: NEGATIVE
LEUKOCYTE ESTERASE URINE, POC: NEGATIVE
NITRITE, POC UA: NEGATIVE
PH, POC UA: 6.5
PROTEIN, POC: NEGATIVE
SPECIFIC GRAVITY, POC UA: 1
UROBILINOGEN, POC UA: NORMAL

## 2024-02-02 PROCEDURE — 3074F SYST BP LT 130 MM HG: CPT | Mod: CPTII,S$GLB,, | Performed by: UROLOGY

## 2024-02-02 PROCEDURE — 99999 PR PBB SHADOW E&M-EST. PATIENT-LVL IV: CPT | Mod: PBBFAC,,, | Performed by: UROLOGY

## 2024-02-02 PROCEDURE — 1160F RVW MEDS BY RX/DR IN RCRD: CPT | Mod: CPTII,S$GLB,, | Performed by: UROLOGY

## 2024-02-02 PROCEDURE — 81002 URINALYSIS NONAUTO W/O SCOPE: CPT | Mod: S$GLB,,, | Performed by: UROLOGY

## 2024-02-02 PROCEDURE — 1159F MED LIST DOCD IN RCRD: CPT | Mod: CPTII,S$GLB,, | Performed by: UROLOGY

## 2024-02-02 PROCEDURE — 99204 OFFICE O/P NEW MOD 45 MIN: CPT | Mod: S$GLB,,, | Performed by: UROLOGY

## 2024-02-02 PROCEDURE — 3078F DIAST BP <80 MM HG: CPT | Mod: CPTII,S$GLB,, | Performed by: UROLOGY

## 2024-02-02 RX ORDER — ANASTROZOLE 1 MG/1
1 TABLET ORAL
COMMUNITY
End: 2024-05-20 | Stop reason: SDUPTHER

## 2024-02-02 RX ORDER — TESTOSTERONE CYPIONATE 200 MG/ML
200 INJECTION, SOLUTION INTRAMUSCULAR
COMMUNITY
End: 2024-05-20 | Stop reason: SDUPTHER

## 2024-02-02 RX ORDER — PREDNISONE 20 MG/1
20 TABLET ORAL 2 TIMES DAILY
COMMUNITY
Start: 2024-01-29

## 2024-02-02 RX ORDER — TEMAZEPAM 7.5 MG/1
1 CAPSULE ORAL NIGHTLY
COMMUNITY
Start: 2024-01-17 | End: 2024-02-16

## 2024-02-02 NOTE — PROGRESS NOTES
Subjective:      Santy Angeles is a 43 y.o. male who was self-referred for evaluation of left testicular pain.      He reports intermittent left testicular pain that began about 1 month ago. Overall severity has improved during this time. Severity is usually worse in the evening.    The following portions of the patient's history were reviewed and updated as appropriate: allergies, current medications, past family history, past medical history, past social history, past surgical history and problem list.    Review of Systems  Constitutional: no fever or chills  ENT: no nasal congestion or sore throat  Respiratory: no cough or shortness of breath  Cardiovascular: no chest pain or palpitations  Gastrointestinal: no nausea or vomiting, tolerating diet  Genitourinary: as per HPI  Hematologic/Lymphatic: no easy bruising or lymphadenopathy  Musculoskeletal: no arthralgias or myalgias  Neurological: no seizures or tremors  Behavioral/Psych: no auditory or visual hallucinations     Objective:   Vitals: /79 (BP Location: Right arm, Patient Position: Sitting, BP Method: Large (Automatic))   Pulse 62     Physical Exam   General: alert and oriented, no acute distress  Head: normocephalic, atraumatic  Neck: supple, no lymphadenopathy, normal ROM, no masses  Respiratory: Symmetric expansion, non-labored breathing  Abdomen: ? left IH  Genitourinary:   Penis: normal, no lesions, patent orthotopic meatus, no plaques  Scrotum: no rashes or skin changes;   Testes: descended bilaterally, no masses, nontender, normal epididymides bilaterally, no hydroceles  Neuro: alert and oriented x3, no gross deficits  Psych: normal judgment and insight, normal mood/affect, and non-anxious    Lab Review   Urinalysis demonstrates negative for all components  Lab Results   Component Value Date    WBC 4.92 06/24/2023    HGB 16.4 06/24/2023    HCT 47.7 06/24/2023    MCV 88 06/24/2023     06/24/2023     Lab Results   Component Value Date     CREATININE 1.0 06/24/2023    BUN 17 06/24/2023     Assessment:     1. Pain in left testicle    2. Hypogonadism male        Plan:   US to eval for inguinal hernia  Refer to  pending above    Explained that I am not taking new patients at this time for TRT. Requests referral to endocrinology.

## 2024-02-03 ENCOUNTER — PATIENT MESSAGE (OUTPATIENT)
Dept: UROLOGY | Facility: CLINIC | Age: 44
End: 2024-02-03
Payer: COMMERCIAL

## 2024-02-06 ENCOUNTER — PATIENT MESSAGE (OUTPATIENT)
Dept: UROLOGY | Facility: CLINIC | Age: 44
End: 2024-02-06
Payer: COMMERCIAL

## 2024-02-09 ENCOUNTER — OCCUPATIONAL HEALTH (OUTPATIENT)
Dept: URGENT CARE | Facility: CLINIC | Age: 44
End: 2024-02-09

## 2024-02-09 DIAGNOSIS — Z00.00 ENCOUNTER FOR PHYSICAL EXAMINATION: Primary | ICD-10-CM

## 2024-02-09 PROCEDURE — 86900 BLOOD TYPING SEROLOGIC ABO: CPT | Mod: QW,S$GLB,, | Performed by: SURGERY

## 2024-02-09 PROCEDURE — 80053 COMPREHEN METABOLIC PANEL: CPT | Mod: QW,S$GLB,, | Performed by: SURGERY

## 2024-02-09 PROCEDURE — 80305 DRUG TEST PRSMV DIR OPT OBS: CPT | Mod: S$GLB,,, | Performed by: SURGERY

## 2024-02-09 PROCEDURE — 87389 HIV-1 AG W/HIV-1&-2 AB AG IA: CPT | Mod: QW,S$GLB,, | Performed by: SURGERY

## 2024-02-09 PROCEDURE — 86580 TB INTRADERMAL TEST: CPT | Mod: S$GLB,,, | Performed by: SURGERY

## 2024-02-09 PROCEDURE — 99172 OCULAR FUNCTION SCREEN: CPT | Mod: S$GLB,,, | Performed by: SURGERY

## 2024-02-09 PROCEDURE — 99499 UNLISTED E&M SERVICE: CPT | Mod: S$GLB,,, | Performed by: SURGERY

## 2024-02-09 PROCEDURE — 20999 UNLISTED PX MUSCSKEL GENERAL: CPT | Mod: S$GLB,,, | Performed by: SURGERY

## 2024-02-09 PROCEDURE — 85025 COMPLETE CBC W/AUTO DIFF WBC: CPT | Mod: QW,S$GLB,, | Performed by: SURGERY

## 2024-02-09 PROCEDURE — 92552 PURE TONE AUDIOMETRY AIR: CPT | Mod: S$GLB,,, | Performed by: SURGERY

## 2024-02-09 PROCEDURE — 99080 SPECIAL REPORTS OR FORMS: CPT | Mod: S$GLB,,, | Performed by: SURGERY

## 2024-02-09 PROCEDURE — 82977 ASSAY OF GGT: CPT | Mod: QW,S$GLB,, | Performed by: SURGERY

## 2024-02-09 PROCEDURE — 84443 ASSAY THYROID STIM HORMONE: CPT | Mod: QW,S$GLB,, | Performed by: SURGERY

## 2024-02-09 PROCEDURE — 80061 LIPID PANEL: CPT | Mod: QW,S$GLB,, | Performed by: SURGERY

## 2024-02-09 PROCEDURE — 86592 SYPHILIS TEST NON-TREP QUAL: CPT | Mod: S$GLB,,, | Performed by: SURGERY

## 2024-02-14 ENCOUNTER — TELEPHONE (OUTPATIENT)
Dept: URGENT CARE | Facility: CLINIC | Age: 44
End: 2024-02-14
Payer: COMMERCIAL

## 2024-02-14 ENCOUNTER — OFFICE VISIT (OUTPATIENT)
Dept: ENDOCRINOLOGY | Facility: CLINIC | Age: 44
End: 2024-02-14
Payer: COMMERCIAL

## 2024-02-14 VITALS
BODY MASS INDEX: 26.18 KG/M2 | HEART RATE: 64 BPM | SYSTOLIC BLOOD PRESSURE: 110 MMHG | DIASTOLIC BLOOD PRESSURE: 86 MMHG | WEIGHT: 198.38 LBS

## 2024-02-14 DIAGNOSIS — Z79.890 LONG-TERM CURRENT USE OF TESTOSTERONE CYPIONATE: ICD-10-CM

## 2024-02-14 DIAGNOSIS — Z12.5 ENCOUNTER FOR SCREENING FOR MALIGNANT NEOPLASM OF PROSTATE: ICD-10-CM

## 2024-02-14 DIAGNOSIS — N46.9 INFERTILITY MALE: ICD-10-CM

## 2024-02-14 DIAGNOSIS — E29.1 HYPOGONADISM IN MALE: Primary | ICD-10-CM

## 2024-02-14 DIAGNOSIS — R79.89 LOW TESTOSTERONE: ICD-10-CM

## 2024-02-14 PROCEDURE — 3008F BODY MASS INDEX DOCD: CPT | Mod: CPTII,S$GLB,, | Performed by: HOSPITALIST

## 2024-02-14 PROCEDURE — 1159F MED LIST DOCD IN RCRD: CPT | Mod: CPTII,S$GLB,, | Performed by: HOSPITALIST

## 2024-02-14 PROCEDURE — 1160F RVW MEDS BY RX/DR IN RCRD: CPT | Mod: CPTII,S$GLB,, | Performed by: HOSPITALIST

## 2024-02-14 PROCEDURE — 3079F DIAST BP 80-89 MM HG: CPT | Mod: CPTII,S$GLB,, | Performed by: HOSPITALIST

## 2024-02-14 PROCEDURE — 99205 OFFICE O/P NEW HI 60 MIN: CPT | Mod: S$GLB,,, | Performed by: HOSPITALIST

## 2024-02-14 PROCEDURE — 99999 PR PBB SHADOW E&M-EST. PATIENT-LVL III: CPT | Mod: PBBFAC,,, | Performed by: HOSPITALIST

## 2024-02-14 PROCEDURE — 3074F SYST BP LT 130 MM HG: CPT | Mod: CPTII,S$GLB,, | Performed by: HOSPITALIST

## 2024-02-14 RX ORDER — NEEDLES, DISPOSABLE 21 G X 1"
NEEDLE, DISPOSABLE MISCELLANEOUS
Qty: 12 EACH | Refills: 4 | Status: ACTIVE | OUTPATIENT
Start: 2024-02-14 | End: 2024-05-20

## 2024-02-14 RX ORDER — SYRINGE, DISPOSABLE, 3 ML
SYRINGE, EMPTY DISPOSABLE MISCELLANEOUS
Qty: 12 EACH | Refills: 4 | Status: ACTIVE | OUTPATIENT
Start: 2024-02-14 | End: 2024-05-20

## 2024-02-14 RX ORDER — CHORIONIC GONADOTROPIN 10000 UNIT
KIT INTRAMUSCULAR
Qty: 20000 UNITS | Refills: 4 | Status: ACTIVE | OUTPATIENT
Start: 2024-02-14 | End: 2024-05-20

## 2024-02-14 NOTE — ASSESSMENT & PLAN NOTE
- advised this can lead to infertility, azoospermia, decrease in testicular size.    -see workup above

## 2024-02-14 NOTE — ASSESSMENT & PLAN NOTE
- due to long-term TRT use  - switch to HCG 3 times a week  - check semen analysis in 3-4 months for monitoring.  May need to check it again in the future.    - May need cryopreservation for IVF  - restart TRT once able

## 2024-02-14 NOTE — PATIENT INSTRUCTIONS
Call [facility] [phone]  for HCG/Pregnyl injection    Or you can call this pharmacy to see if it is cheaper>>[facility] - Ph: [phone]    STOP testosterone injection    Get Semen Analysis after 3 months of being on HCG/Pregnyl   Fasting lab work and follow up

## 2024-02-14 NOTE — ASSESSMENT & PLAN NOTE
- patient on long-term TRT/testosterone cypionate for 3-4 years.  - unclear if secondary workup was completed by endocrinologist in [place].  - unable to stop TRT abruptly due to worsening symptoms, previously attempted 08/2023.  And currently does not those symptoms due to anticipated increase physical demand of  school  - patient and wife are trying to get pregnant soon.  Given her age of 40, he would like to get a successful pregnancy sooner than later  - has seen [facility]  - patient was seen by 2 endocrinologist here in [facility], as well as seen by a general urologist  - advised the need to stop testosterone cypionate injection at this time given suppression of spermatogenesis.  He is aware  - will attempt to restart HPG axis with HCG injection 3 times a week.  - side effect profile discussed, patient was comfortable in pursuing this plan as it will help with improvement in spermatogenesis  - advised it may take 3-6 months for improvement/spermatogenesis>> will get semen analysis in 3-4 months, prescription given in hand today  - advised patient to get previous semen analysis for baseline.  He will call to get information faxed to us  - advise that HCG my not be adequate enough to get his testosterone level like previous range, but it should be acceptable enough to prevent his hypogonadism symptoms.  - will monitor lab work:  Testosterone panel, pursue secondary workup, monitor liver enzyme, CBC, PSA, Y microdeletion, ferritin  - discussed with patient once successful pregnancy or cryopreservation of viable sperm we can restart TRT  - okay for patient to continue anastrozole 1 mg twice a week  - in 3-4 months to review and adjust    -----------------------------------------------------

## 2024-02-14 NOTE — TELEPHONE ENCOUNTER
Results of PPE Labs, glucose elevated, non-fasting. Keep regular health maintenance visits with PCP.

## 2024-02-14 NOTE — PROGRESS NOTES
Subjective:      Patient ID: Santy Angeles is a 43 y.o. male presented to [facility] on 2/14/2024.  Chief complaint:  Low Testosterone      History of Present illness: Santy Angeles is a 43 y.o. male here for  low testosterone/hypogonadism, infertility  Other significant past medical history:  YESIKA, restless leg syndrome, interstitial cystitis    Interval history: New to me, previously seen by multiple endocrinologist here in town:  Dr. Navarrete, Dr. Santoro ([facility]), patient also was seen endocrinology in [place].  He was started on TRT around 3 years ago for low testosterone and with symptoms.  He and his 41-year-old wife are actively trying to conceive.  Unclear if he saw fertility institute that told him to stop testosterone or with Dr. Santoro> when stopping testosterone, testosterone total decreased to 210 with low free testosterone 8/2023  He notes fatigue, low libido and mood changes since stopping TRT.   Per patient he restarted TRT due to his symptoms.  Currently on testosterone cypionate 100 mcg Q 7 days, on anastrozole chronically 1 mg twice a week since starting TRT by endocrinology in [place]    Patient reports that he is planning to go to  training school.  Needs to have energy, motivation,to attend school.    Therefore would not want toAbruptly stopped TRT   12/21/2023: Testosterone: 621, Free testosterone 56.9.    Mildly fluctuate liver enzyme for which he is concerned.  Interestingly most recent liver enzyme are normal.      Male hypogonadism/infertility  - Report symptoms of fatigue, mood changes, and low libido around 39. He was a  and thought his symptoms may have been related to shift work and work-related stress.   - He was evaluated by an endocrinologist in [place] was told testosterone was normal but estrogen was elevated. He does not have results of biochemical workup with him, patient then told me that his testosterone was under 300 at that time. He was  "started on testosterone 200 mg IM weekly and Anastrazole 1 mg twice weekly.  - while on TRT denies erectile dysfunction, gynecomastia, muscle weakness or loss of muscle mass. He had improvement in libido, ability to exercise, build muscle mass, and mood.   - He moved from [place] and established care with Dr. Navarrete in [place] after he ran out of the testosterone. He was put on testosterone 200 mg IM weekly and Anastrazole 1mg daily. He had repeat testosterone level of 2470 ng/dl and free testosterone 621 pg/ml on 8/22/23 and was told to space testosterone every 10 days and anastrazole every 2 days per week. He has never tried other formulations of TRT. He did notice some fullness of right breast after starting testosterone.   - He also did notice some atrophy of testes while on TRT.He is also taking DHEA and pregnenolone, which has been stop.   - He said PSA vesna while on treatment in [place] and was told to start saw palmetto which he was still taking. He had some dribbling but otherwise no significant LUTS and says PSA improved.   - Medication use:  Denies use of Opioid, Tramadol  - History of vasectomy, testicular trauma, varicocele, hydrocele, hernia, did see Urology recently  - Per note "Testes: approx 10 ml bilaterally "  - Anabolic steroids?: no  - Excessive EtOH?: no  - Ilicit drug use, marijuana use: no  - Was in former , [employer]  - Does NOT have children, yes plan for children  - unclear if he saw fertility institute that told him to stop testosterone or with Dr. Santoro  - He was evaluated at [facility] [place], did do semen analysis while off testosterone> Records unavailable.  Did have repeat semen analysis when on testosterone, that results was lost".   - Patient reports that wife "clean bill of health"  - He notes fatigue, low libido and mood changes since stopping TRT.   - He denies HTN, DM, HLD. He has YESIKA but has had difficulty with CPAP. His father had prostate " cancer.     Lab Results   Component Value Date    ALBUMIN 4.3 06/24/2023    ALBUMIN 4.1 10/20/2022      Secondary workup   Lab Results   Component Value Date    TSH 2.394 06/24/2023    HCT 47.7 06/24/2023    HCT 47.7 10/20/2022     [facility] testosterone level       The patient's medications, allergies, past medical, surgical, social and family histories were reviewed and updated as appropriate.  Review of Systems: pertinent positives as per the above HPI, and otherwise negative.    Objective:   /86   Pulse 64   Wt 90 kg (198 lb 6.4 oz)   BMI 26.18 kg/m²   Body mass index is 26.18 kg/m².  Vital signs reviewed    Physical Exam  Vitals and nursing note reviewed.   Constitutional:       Appearance: Normal appearance. He is well-developed. He is not ill-appearing.   Neck:      Thyroid: No thyromegaly.   Pulmonary:      Effort: Pulmonary effort is normal. No respiratory distress.   Musculoskeletal:         General: Normal range of motion.      Cervical back: Normal range of motion.   Neurological:      General: No focal deficit present.      Mental Status: He is alert. Mental status is at baseline.   Psychiatric:         Mood and Affect: Mood normal.         Behavior: Behavior normal.       Labs reviewed:  See results in subjective  Lab Results   Component Value Date    HGBA1C 5.1 06/24/2023     Lab Results   Component Value Date    CHOL 140 06/24/2023    HDL 41 06/24/2023    LDLCALC 80.8 06/24/2023    TRIG 91 06/24/2023    CHOLHDL 29.3 06/24/2023     Lab Results   Component Value Date     06/24/2023    K 3.8 06/24/2023     06/24/2023    CO2 23 06/24/2023    GLU 92 06/24/2023    BUN 17 06/24/2023    CREATININE 1.0 06/24/2023    CALCIUM 9.4 06/24/2023    PROT 7.1 06/24/2023    ALBUMIN 4.3 06/24/2023    BILITOT 0.6 06/24/2023    ALKPHOS 59 06/24/2023    AST 23 06/24/2023    ALT 20 06/24/2023    ANIONGAP 10 06/24/2023    TSH 2.394 06/24/2023       Assessment     1. Hypogonadism in male  ACTH    Luteinizing  "hormone    Follicle stimulating hormone    Prolactin    TSH    T4, free    Comprehensive metabolic panel    Testosterone Panel    Estrogens, total    CBC with Auto Differential    Lipid Panel    chorionic gonadotropin, human (PREGNYL) 10,000 unit injection    needle, disp, 18 G 18 gauge x 1 1/2" Ndle    needle, disp, 21 G (MONOJECT HYPODERMIC NEEDLES) 21 gauge x 1 1/2" Ndle    syringe, disposable, 3 mL Syrg    FERRITIN    Y Chromosome Microdeletions, Molecular Detection      2. Low testosterone  Luteinizing hormone    Follicle stimulating hormone    Lipid Panel      3. Infertility male  Luteinizing hormone    Follicle stimulating hormone    Prolactin    TSH    T4, free    Comprehensive metabolic panel    Testosterone Panel    Estrogens, total    FERRITIN    Y Chromosome Microdeletions, Molecular Detection      4. Encounter for screening for malignant neoplasm of prostate  PSA      5. Long-term current use of testosterone cypionate  Testosterone Panel    Lipid Panel         Plan     Hypogonadism in male  - patient on long-term TRT/testosterone cypionate for 3-4 years.  - unclear if secondary workup was completed by endocrinologist in [place].  - unable to stop TRT abruptly due to worsening symptoms, previously attempted 08/2023.  And currently does not those symptoms due to anticipated increase physical demand of  school  - patient and wife are trying to get pregnant soon.  Given her age of 41, he would like to get a successful pregnancy sooner than later  - has seen [facility]  - patient was seen by 2 endocrinologist here in [facility], as well as seen by a general urologist  - advised the need to stop testosterone cypionate injection at this time given suppression of spermatogenesis.  He is aware  - will attempt to restart HPG axis with HCG injection 3 times a week.  - side effect profile discussed, patient was comfortable in pursuing this plan as it will help with improvement in " spermatogenesis  - advised it may take 3-6 months for improvement/spermatogenesis>> will get semen analysis in 3-4 months, prescription given in hand today  - advised patient to get previous semen analysis for baseline.  He will call to get information faxed to us  - advise that HCG my not be adequate enough to get his testosterone level like previous range, but it should be acceptable enough to prevent his hypogonadism symptoms.  - will monitor lab work:  Testosterone panel, pursue secondary workup, monitor liver enzyme, CBC, PSA, Y microdeletion, ferritin  - discussed with patient once successful pregnancy or cryopreservation of viable sperm we can restart TRT  - okay for patient to continue anastrozole 1 mg twice a week  - in 3-4 months to review and adjust    -----------------------------------------------------          Long-term current use of testosterone cypionate  - advised this can lead to infertility, azoospermia, decrease in testicular size.    -see workup above    Infertility male  - due to long-term TRT use  - switch to HCG 3 times a week  - check semen analysis in 3-4 months for monitoring.  May need to check it again in the future.    - May need cryopreservation for IVF  - restart TRT once able    Advised patient to follow up with PCP for routine health maintenance care.   RTC in 3-4 months  Semen analysis prescription faxed to [facility] [phone]    Total Time for E/M Service preformed was greater than 60 minutes: Including review of medical records, direct face-to-face time with patient with discussion infertility, testosterone use, hypogonadism. Along with active medical decision-making in office today.     Yonny Chacko M.D.  Endocrinology  [facility]  2/14/2024      Disclaimer: This note has been generated in part with the use of voice-recognition software. There may be typographical errors that have been missed during proof-reading.

## 2024-04-20 ENCOUNTER — PATIENT MESSAGE (OUTPATIENT)
Dept: ADMINISTRATIVE | Facility: OTHER | Age: 44
End: 2024-04-20
Payer: COMMERCIAL

## 2024-05-20 ENCOUNTER — OFFICE VISIT (OUTPATIENT)
Dept: ENDOCRINOLOGY | Facility: CLINIC | Age: 44
End: 2024-05-20
Payer: COMMERCIAL

## 2024-05-20 VITALS
DIASTOLIC BLOOD PRESSURE: 78 MMHG | HEART RATE: 72 BPM | SYSTOLIC BLOOD PRESSURE: 120 MMHG | BODY MASS INDEX: 26.78 KG/M2 | WEIGHT: 203 LBS

## 2024-05-20 DIAGNOSIS — Z79.890 LONG-TERM CURRENT USE OF TESTOSTERONE CYPIONATE: ICD-10-CM

## 2024-05-20 DIAGNOSIS — E29.1 HYPOGONADISM IN MALE: ICD-10-CM

## 2024-05-20 DIAGNOSIS — E29.1 HYPOGONADISM IN MALE: Primary | ICD-10-CM

## 2024-05-20 PROCEDURE — 3074F SYST BP LT 130 MM HG: CPT | Mod: CPTII,S$GLB,, | Performed by: HOSPITALIST

## 2024-05-20 PROCEDURE — 3008F BODY MASS INDEX DOCD: CPT | Mod: CPTII,S$GLB,, | Performed by: HOSPITALIST

## 2024-05-20 PROCEDURE — 3078F DIAST BP <80 MM HG: CPT | Mod: CPTII,S$GLB,, | Performed by: HOSPITALIST

## 2024-05-20 PROCEDURE — 99999 PR PBB SHADOW E&M-EST. PATIENT-LVL III: CPT | Mod: PBBFAC,,, | Performed by: HOSPITALIST

## 2024-05-20 PROCEDURE — 1159F MED LIST DOCD IN RCRD: CPT | Mod: CPTII,S$GLB,, | Performed by: HOSPITALIST

## 2024-05-20 PROCEDURE — 99215 OFFICE O/P EST HI 40 MIN: CPT | Mod: S$GLB,,, | Performed by: HOSPITALIST

## 2024-05-20 RX ORDER — ANASTROZOLE 1 MG/1
1 TABLET ORAL
Qty: 24 TABLET | Refills: 0 | Status: SHIPPED | OUTPATIENT
Start: 2024-05-20 | End: 2024-05-21

## 2024-05-20 RX ORDER — TESTOSTERONE CYPIONATE 200 MG/ML
100 INJECTION, SOLUTION INTRAMUSCULAR
Qty: 6 ML | Refills: 1 | Status: SHIPPED | OUTPATIENT
Start: 2024-05-20

## 2024-05-20 RX ORDER — NEEDLES, DISPOSABLE 25GX5/8"
NEEDLE, DISPOSABLE MISCELLANEOUS
Qty: 15 EACH | Refills: 6 | Status: SHIPPED | OUTPATIENT
Start: 2024-05-20

## 2024-05-20 RX ORDER — ISOPROPYL ALCOHOL 70 ML/100ML
SWAB TOPICAL
Qty: 50 EACH | Refills: 6 | Status: SHIPPED | OUTPATIENT
Start: 2024-05-20

## 2024-05-20 RX ORDER — SYRINGE, DISPOSABLE, 3 ML
SYRINGE, EMPTY DISPOSABLE MISCELLANEOUS
Qty: 15 EACH | Refills: 6 | Status: SHIPPED | OUTPATIENT
Start: 2024-05-20

## 2024-05-20 NOTE — PROGRESS NOTES
Subjective:      Patient ID: Santy Angeles is a 44 y.o. male presented to [facility] on 5/20/2024.  Chief complaint:  Hypogonadism      History of Present illness: Santy Angeles is a 44 y.o. male here for  low testosterone/hypogonadism, infertility  Other significant past medical history:  YESIKA, restless leg syndrome, interstitial cystitis    Interval history: Off testosterone since 2/2024, here for follow-up with me.Given patient and wife's plan, started him on Pregnyl 1500 IU 3 times a week in with the cessation of testosterone therapy 02/2024.  Patient has been been on this medication.  Reports to be very expensive. Not feeling well, decrease in energy.  Decrease in his overall appearance.  No issues with getting erection maintaining erection.    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  Working Monday through Saturday.  ON HCG >400 dollars a month , he has been pain for  Did not get SEMEN analysis as requested.  Due to patient's issue with providing samples and lost of sample in the past  After long discussion he told me that He is not wanting to get Pregnyl, no longer instead fertility.  Given current situation he really wants to be back on testosterone cypionate IM injection  Unfortunately he did not do any of my lab work as scheduled prior to this office visit.  Therefore there are no data, for evaluation      Male hypogonadism/infertility  Initial HPI:  - Report symptoms of fatigue, mood changes, and low libido around 39. He was a  and thought his symptoms may have been related to shift work and work-related stress.   - He was evaluated by an endocrinologist in [place] was told testosterone was normal but estrogen was elevated. He does not have results of biochemical workup with him, patient then told me that his testosterone was under 300 at that time. He was started on testosterone 200 mg IM weekly and Anastrazole 1 mg twice weekly.  -  "while on TRT denies erectile dysfunction, gynecomastia, muscle weakness or loss of muscle mass. He had improvement in libido, ability to exercise, build muscle mass, and mood.     - He moved from [place] and established care with Dr. Navarrete in [place] after he ran out of the testosterone. He was put on testosterone 200 mg IM weekly and Anastrazole 1mg daily. He had repeat testosterone level of 2470 ng/dl and free testosterone 621 pg/ml on 8/22/23 and was told to space testosterone every 10 days and anastrazole every 2 days per week. He has never tried other formulations of TRT. He did notice some fullness of right breast after starting testosterone.   - He also did notice some atrophy of testes while on TRT.He is also taking DHEA and pregnenolone, which has been stop.   - He said PSA vesna while on treatment in [place] and was told to start saw palmetto which he was still taking. He had some dribbling but otherwise no significant LUTS and says PSA improved.   - Medication use:  Denies use of Opioid, Tramadol  - History of vasectomy, testicular trauma, varicocele, hydrocele, hernia, did see Urology recently  - Per note "Testes: approx 10 ml bilaterally "  - Anabolic steroids?: no  - Excessive EtOH?: no  - Ilicit drug use, marijuana use: no  - Was in former , [employer]  - Does NOT have children, yes plan for children  - unclear if he saw fertility institute that told him to stop testosterone or with Dr. Santoro  - He was evaluated at [place] [facility], did do semen analysis while off testosterone> Records unavailable.  Did have repeat semen analysis when on testosterone, that results was lost".   - Patient reports that wife "clean bill of health"  - He notes fatigue, low libido and mood changes since stopping TRT.   - He denies HTN, DM, HLD. He has YESIKA but has had difficulty with CPAP. His father had prostate cancer.     Previously on  testosterone cypionate 100 mcg Q 7 days, on anastrozole " chronically 1 mg twice a week since starting TRT by endocrinology in [place]      Previous Office visit 2/14/2024: New to me, previously seen by multiple endocrinologist here in town:  Dr. Navarrete, Dr. Santoro ([facility]), patient also was seen endocrinology in [place].  He was started on TRT around 3 years ago for low testosterone and with symptoms. He and his 41-year-old wife are actively trying to conceive. Unclear if he saw fertility institute that told him to stop testosterone or with Dr. Santoro> when stopping testosterone, testosterone total decreased to 210 with low free testosterone 8/2023 He notes fatigue, low libido and mood changes since stopping TRT.   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  Needs to have energy, motivation,to attend school.    Given patient and wife's plan, started him on Pregnyl 1500 IU 3 times a week in with the cessation of testosterone therapy 02/2024.  Patient has been been on this medication.  Reports to be very expensive.  Not feeling well.  Stressful at school.      Lab Results   Component Value Date    ALBUMIN 4.3 06/24/2023    ALBUMIN 4.1 10/20/2022      Secondary workup   Lab Results   Component Value Date    TSH 2.394 06/24/2023    HCT 47.7 06/24/2023    HCT 47.7 10/20/2022     [facility] testosterone level       The patient's medications, allergies, past medical, surgical, social and family histories were reviewed and updated as appropriate.  Review of Systems: pertinent positives as per the above HPI, and otherwise negative.    Objective:   /78   Pulse 72   Wt 92.1 kg (203 lb)   BMI 26.78 kg/m²   Body mass index is 26.78 kg/m².  Vital signs reviewed    Physical Exam  Vitals and nursing note reviewed.   Constitutional:       Appearance: Normal appearance. He is well-developed. He is not ill-appearing.   Neck:      Thyroid: No thyromegaly.   Pulmonary:      Effort: Pulmonary effort is normal. No respiratory distress.   Musculoskeletal:          "General: Normal range of motion.      Cervical back: Normal range of motion.   Neurological:      General: No focal deficit present.      Mental Status: He is alert. Mental status is at baseline.   Psychiatric:         Mood and Affect: Mood normal.         Behavior: Behavior normal.       Labs reviewed:  See results in subjective  Lab Results   Component Value Date    HGBA1C 5.1 06/24/2023     Lab Results   Component Value Date    CHOL 140 06/24/2023    HDL 41 06/24/2023    LDLCALC 80.8 06/24/2023    TRIG 91 06/24/2023    CHOLHDL 29.3 06/24/2023     Lab Results   Component Value Date     06/24/2023    K 3.8 06/24/2023     06/24/2023    CO2 23 06/24/2023    GLU 92 06/24/2023    BUN 17 06/24/2023    CREATININE 1.0 06/24/2023    CALCIUM 9.4 06/24/2023    PROT 7.1 06/24/2023    ALBUMIN 4.3 06/24/2023    BILITOT 0.6 06/24/2023    ALKPHOS 59 06/24/2023    AST 23 06/24/2023    ALT 20 06/24/2023    ANIONGAP 10 06/24/2023    TSH 2.394 06/24/2023       Assessment     1. Hypogonadism in male  testosterone cypionate (DEPOTESTOTERONE CYPIONATE) 200 mg/mL injection    anastrozole (ARIMIDEX) 1 mg Tab    Testosterone    CBC Without Differential    PSA, Screening    Comprehensive Metabolic Panel    Hemoglobin    Hematocrit    Estradiol    Luteinizing Hormone    Follicle Stimulating Hormone    needle, disp, 18 G (HYPODERMIC NEEDLES) 18 gauge x 1 1/2" Ndle    needle, disp, 21 G (HYPODERMIC NEEDLES) 21 gauge x 1 1/2" Ndle    alcohol swabs (ALCOHOL WIPES) PadM    syringe, disposable, 3 mL Syrg      2. Long-term current use of testosterone cypionate             Plan     Hypogonadism in male  - Patient on long-term TRT/testosterone cypionate for 3-4 years. Unclear if secondary workup was completed by endocrinologist in [place].  - unable to stop TRT abruptly due to worsening symptoms, previously attempted 08/2023.    - During office visit 2/2024, patient reports that he and his wife are trying to get pregnant soon.  Given her " age of 41, he would like to get a successful pregnancy sooner than later. Has seen [facility]  - Patient was seen by 2 endocrinologist here in [facility], as well as seen by a general urologist  - During office visit 2/2024, WHILE OFF TRT he was started on Pregnyl/HCG injection 1500 units 3 times a week to help with spermatogenesis  - Follow up 5/20/2024:  Patient reports that Pregnyl/HCG injection are expensive, he is not satisfied about his current symptoms.  Due to stressful time/physical demand of  school  - patient is did not get repeat testosterone lab work nor did he do semen analysis as requested by me at the last office visit 02/2024  - patient is requesting to stop HCG and be restarted on TRT due to overall mood.  - I explained that it is very difficult to do TRT and maintain spermatogenesis at the same time.  He verbalized understanding.  At this time he is not interested in fertility      Plan  - START Testosterone cypionate 100 mcg Q 7 days, on anastrozole chronically 1 mg twice a week, as this was his previous regimen in [place]  - patient plans to get testosterone cypionate injection on Friday, will repeat lab work in 3 months, with midway testosterone level to be checked on a Wednesday 8:00 a.m..  - Recommend weight loss, proper lifestyle changes, eating habit, proper sleep, avoidance of ETOH/tobacco/drug use>> discussed  - Discussed with patient about testosterone use, can worsening PSA/prostate issues, elevated liver enzyme cardiac issues, hypertension, polycythemia, mood disorder >> patient is aware, need to monitor symptoms  - Discuss that testosterone goal will be 400-700s, no plan to increase further doses if he is still having issue with fatigue/libido  - Discuss that testosterone is a controlled substance.  He will need to get the prescription for 1 provider only.  - Discussed with patient about testosterone use, can worsening PSA/prostate issues, elevated liver enzyme  cardiac issues, hypertension, polycythemia, mood disorder >> patient is aware and agree with this plan, he is aware of risk  - Monitor routine lab work: Hematocrit/hemoglobin, liver enzyme, lipid panel, PSA level yearly    Follow up, monitoring  - Discuss that testosterone goal will be 400-800s, no plan to increase doses further to treat symptoms of fatigue  - Discuss that testosterone is a controlled substance.  He will need to get the prescription for 1 provider only.  - Discussed with patient about testosterone side effects as above.  Will monitor symptoms.  And that may need cessation of testosterone if major abnormalities are noted    Long-term current use of testosterone cypionate  - Restart therapy as off 5/20/2024, per patient request  - not interested in fertility     Advised patient to follow up with PCP for routine health maintenance care.   RTC in 3-4 months    Total Time for E/M Service preformed was greater than 40 minutes: Including review of medical records, direct face-to-face time with patient with discussion infertility, testosterone use, hypogonadism. Along with active medical decision-making in office today.     Yonny Chacko M.D.  Endocrinology  [facility]  5/20/2024      Disclaimer: This note has been generated in part with the use of voice-recognition software. There may be typographical errors that have been missed during proof-reading.

## 2024-05-21 DIAGNOSIS — E29.1 HYPOGONADISM IN MALE: ICD-10-CM

## 2024-05-21 RX ORDER — ANASTROZOLE 1 MG/1
TABLET ORAL
Qty: 25 TABLET | Refills: 1 | Status: SHIPPED | OUTPATIENT
Start: 2024-05-21

## 2024-05-21 RX ORDER — CHORIONIC GONADOTROPIN 10000 UNIT
KIT INTRAMUSCULAR
Qty: 20000 UNITS | Refills: 4 | OUTPATIENT
Start: 2024-05-21

## 2024-05-24 ENCOUNTER — TELEPHONE (OUTPATIENT)
Dept: ENDOCRINOLOGY | Facility: CLINIC | Age: 44
End: 2024-05-24
Payer: COMMERCIAL

## 2024-05-24 NOTE — TELEPHONE ENCOUNTER
Called pt to ask if dr hay gave him a printed rx pt paused and stated yes he had one and he forgot about it. Pt has it and will go back to the pharmacy. Understanding verbalized.

## 2024-05-24 NOTE — TELEPHONE ENCOUNTER
----- Message from Hazel Mckeon sent at 5/24/2024  4:30 PM CDT -----  Regarding: self [phone]  Type: Patient Call Back    Who called: self     What is the request in detail: pt stated pharmacy has not received the RX for testosterone cypionate (DEPOTESTOTERONE CYPIONATE) 200 mg/mL injection but has received everything else.     [id number] - [place] [address] AT [facility] [address]  [address]  [facility] [address]  Phone: [phone] Fax: [phone]       Can the clinic reply by [facility]?  yes    Would the patient rather a call back or a response via [facility]?  My chart     Best call back number: [phone]

## 2024-06-03 ENCOUNTER — OCCUPATIONAL HEALTH (OUTPATIENT)
Dept: URGENT CARE | Facility: CLINIC | Age: 44
End: 2024-06-03
Payer: COMMERCIAL

## 2024-06-03 DIAGNOSIS — Z00.00 ENCOUNTER FOR PHYSICAL EXAMINATION: Primary | ICD-10-CM

## 2024-06-03 PROCEDURE — 99172 OCULAR FUNCTION SCREEN: CPT | Mod: S$GLB,,, | Performed by: STUDENT IN AN ORGANIZED HEALTH CARE EDUCATION/TRAINING PROGRAM

## 2024-06-03 PROCEDURE — 80305 DRUG TEST PRSMV DIR OPT OBS: CPT | Mod: S$GLB,,, | Performed by: STUDENT IN AN ORGANIZED HEALTH CARE EDUCATION/TRAINING PROGRAM

## 2024-06-03 PROCEDURE — 86592 SYPHILIS TEST NON-TREP QUAL: CPT | Mod: S$GLB,,, | Performed by: STUDENT IN AN ORGANIZED HEALTH CARE EDUCATION/TRAINING PROGRAM

## 2024-06-03 PROCEDURE — 99080 SPECIAL REPORTS OR FORMS: CPT | Mod: S$GLB,,, | Performed by: STUDENT IN AN ORGANIZED HEALTH CARE EDUCATION/TRAINING PROGRAM

## 2024-06-03 PROCEDURE — 86900 BLOOD TYPING SEROLOGIC ABO: CPT | Mod: S$GLB,,, | Performed by: STUDENT IN AN ORGANIZED HEALTH CARE EDUCATION/TRAINING PROGRAM

## 2024-06-03 PROCEDURE — 92552 PURE TONE AUDIOMETRY AIR: CPT | Mod: S$GLB,,, | Performed by: STUDENT IN AN ORGANIZED HEALTH CARE EDUCATION/TRAINING PROGRAM

## 2024-06-03 PROCEDURE — 87389 HIV-1 AG W/HIV-1&-2 AB AG IA: CPT | Mod: QW,S$GLB,, | Performed by: STUDENT IN AN ORGANIZED HEALTH CARE EDUCATION/TRAINING PROGRAM

## 2024-06-03 PROCEDURE — 84443 ASSAY THYROID STIM HORMONE: CPT | Mod: QW,S$GLB,, | Performed by: STUDENT IN AN ORGANIZED HEALTH CARE EDUCATION/TRAINING PROGRAM

## 2024-06-03 PROCEDURE — 86580 TB INTRADERMAL TEST: CPT | Mod: S$GLB,,, | Performed by: STUDENT IN AN ORGANIZED HEALTH CARE EDUCATION/TRAINING PROGRAM

## 2024-06-03 PROCEDURE — 20999 UNLISTED PX MUSCSKEL GENERAL: CPT | Mod: S$GLB,,, | Performed by: STUDENT IN AN ORGANIZED HEALTH CARE EDUCATION/TRAINING PROGRAM

## 2024-06-03 PROCEDURE — 80061 LIPID PANEL: CPT | Mod: QW,S$GLB,, | Performed by: STUDENT IN AN ORGANIZED HEALTH CARE EDUCATION/TRAINING PROGRAM

## 2024-06-03 PROCEDURE — 81003 URINALYSIS AUTO W/O SCOPE: CPT | Mod: QW,S$GLB,, | Performed by: STUDENT IN AN ORGANIZED HEALTH CARE EDUCATION/TRAINING PROGRAM

## 2024-06-03 PROCEDURE — 99499 UNLISTED E&M SERVICE: CPT | Mod: S$GLB,,, | Performed by: STUDENT IN AN ORGANIZED HEALTH CARE EDUCATION/TRAINING PROGRAM

## 2024-06-03 PROCEDURE — 80053 COMPREHEN METABOLIC PANEL: CPT | Mod: QW,S$GLB,, | Performed by: STUDENT IN AN ORGANIZED HEALTH CARE EDUCATION/TRAINING PROGRAM

## 2024-06-03 PROCEDURE — 82977 ASSAY OF GGT: CPT | Mod: QW,S$GLB,, | Performed by: STUDENT IN AN ORGANIZED HEALTH CARE EDUCATION/TRAINING PROGRAM

## 2024-06-04 ENCOUNTER — TELEPHONE (OUTPATIENT)
Dept: URGENT CARE | Facility: CLINIC | Age: 44
End: 2024-06-04
Payer: COMMERCIAL

## 2024-06-05 LAB
TB INDURATION - 48 HR READ: 0 MM
TB INDURATION - 72 HR READ: NORMAL
TB SKIN TEST - 48 HR READ: NEGATIVE
TB SKIN TEST - 72 HR READ: NORMAL

## 2024-07-16 ENCOUNTER — TELEPHONE (OUTPATIENT)
Dept: DERMATOLOGY | Facility: CLINIC | Age: 44
End: 2024-07-16
Payer: COMMERCIAL

## 2024-07-16 NOTE — TELEPHONE ENCOUNTER
----- Message from Summer Solitario sent at 7/16/2024  3:40 PM CDT -----  Same Day Appointment Request     Caller Is Requesting A Same Day Appointment      Caller Declined First Available Appointment? PT CALLED TO SPEAK WITH THE OFFICE ABOUT BEING SEEN THIS WEEK 7/17-18/2024     Best Call Back Number? [phone]    Additional Information:       Thank You   RECTAL BLeeding/ abdominal pain today.

## 2024-07-24 ENCOUNTER — OFFICE VISIT (OUTPATIENT)
Dept: DERMATOLOGY | Facility: CLINIC | Age: 44
End: 2024-07-24
Payer: COMMERCIAL

## 2024-07-24 DIAGNOSIS — Z12.83 SCREENING EXAM FOR SKIN CANCER: ICD-10-CM

## 2024-07-24 DIAGNOSIS — Z85.828 HISTORY OF NONMELANOMA SKIN CANCER: ICD-10-CM

## 2024-07-24 DIAGNOSIS — L71.9 ROSACEA: ICD-10-CM

## 2024-07-24 DIAGNOSIS — L81.4 LENTIGO: ICD-10-CM

## 2024-07-24 DIAGNOSIS — L82.1 SEBORRHEIC KERATOSES: ICD-10-CM

## 2024-07-24 DIAGNOSIS — D18.01 CHERRY ANGIOMA: ICD-10-CM

## 2024-07-24 DIAGNOSIS — D22.9 MULTIPLE BENIGN NEVI: ICD-10-CM

## 2024-07-24 DIAGNOSIS — L50.3 DERMATOGRAPHISM: ICD-10-CM

## 2024-07-24 DIAGNOSIS — L50.1 CHRONIC IDIOPATHIC URTICARIA: Primary | ICD-10-CM

## 2024-07-24 PROCEDURE — 99214 OFFICE O/P EST MOD 30 MIN: CPT | Mod: S$GLB,,, | Performed by: STUDENT IN AN ORGANIZED HEALTH CARE EDUCATION/TRAINING PROGRAM

## 2024-07-24 PROCEDURE — 99999 PR PBB SHADOW E&M-EST. PATIENT-LVL III: CPT | Mod: PBBFAC,,, | Performed by: STUDENT IN AN ORGANIZED HEALTH CARE EDUCATION/TRAINING PROGRAM

## 2024-07-24 PROCEDURE — 1159F MED LIST DOCD IN RCRD: CPT | Mod: CPTII,S$GLB,, | Performed by: STUDENT IN AN ORGANIZED HEALTH CARE EDUCATION/TRAINING PROGRAM

## 2024-07-24 PROCEDURE — 1160F RVW MEDS BY RX/DR IN RCRD: CPT | Mod: CPTII,S$GLB,, | Performed by: STUDENT IN AN ORGANIZED HEALTH CARE EDUCATION/TRAINING PROGRAM

## 2024-07-24 RX ORDER — HYDROXYZINE HYDROCHLORIDE 10 MG/1
TABLET, FILM COATED ORAL
Qty: 180 TABLET | Refills: 0 | Status: SHIPPED | OUTPATIENT
Start: 2024-07-24

## 2024-07-24 RX ORDER — METRONIDAZOLE 7.5 MG/G
GEL TOPICAL 2 TIMES DAILY
Qty: 45 G | Refills: 10 | Status: SHIPPED | OUTPATIENT
Start: 2024-07-24 | End: 2025-07-24

## 2024-07-24 NOTE — PROGRESS NOTES
Subjective:      Patient ID:  Santy Angeles is a 44 y.o. male who presents for   Chief Complaint   Patient presents with    Spot     back    Rash     arms     Pt present for rash on arms & spots on back.     No family h/o skin cancer , personal h/o SCC.     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 Started about 2 weeks ago. He gets red itchy bumps. He takes benadryl and they resolve within 20 minutes  He feels he has had something similar and was able to manage with 1-2 prn hydroxyzine monthly and would like a refill    Also uses metrogel for acne / rosacea and washes with selsun blue shampoo which he feels helps    Last TBSE 7/2023    Review of Systems   Skin:  Positive for rash.       Objective:   Physical Exam   Constitutional: He appears well-developed and well-nourished. No distress.   Neurological: He is alert and oriented to person, place, and time. He is not disoriented.   Psychiatric: He has a normal mood and affect.   Skin:   Areas Examined (abnormalities noted in diagram):   Scalp / Hair Palpated and Inspected  Head / Face Inspection Performed  Neck Inspection Performed  Chest / Axilla Inspection Performed  Abdomen Inspection Performed  Back Inspection Performed  RUE Inspected  LUE Inspection Performed  RLE Inspected  LLE Inspection Performed  Nails and Digits Inspection Performed                 Diagram Legend     Erythematous scaling macule/papule c/w actinic keratosis       Vascular papule c/w angioma      Pigmented verrucoid papule/plaque c/w seborrheic keratosis      Yellow umbilicated papule c/w sebaceous hyperplasia      Irregularly shaped tan macule c/w lentigo     1-2 mm smooth white papules consistent with Milia      Movable subcutaneous cyst with punctum c/w epidermal inclusion cyst      Subcutaneous movable cyst c/w pilar cyst      Firm pink to brown papule c/w  dermatofibroma      Pedunculated fleshy papule(s) c/w skin tag(s)      Evenly pigmented macule c/w junctional nevus     Mildly variegated pigmented, slightly irregular-bordered macule c/w mildly atypical nevus      Flesh colored to evenly pigmented papule c/w intradermal nevus       Pink pearly papule/plaque c/w basal cell carcinoma      Erythematous hyperkeratotic cursted plaque c/w SCC      Surgical scar with no sign of skin cancer recurrence      Open and closed comedones      Inflammatory papules and pustules      Verrucoid papule consistent consistent with wart     Erythematous eczematous patches and plaques     Dystrophic onycholytic nail with subungual debris c/w onychomycosis     Umbilicated papule    Erythematous-base heme-crusted tan verrucoid plaque consistent with inflamed seborrheic keratosis     Erythematous Silvery Scaling Plaque c/w Psoriasis     See annotation      Assessment / Plan:        Chronic idiopathic urticaria  Dermatographism  - recommended zyrtec or allegra otc. Start 1 pill bid. Can titrate up to 2 pills bid if tolerating and not too drowsy. He feels that hydroxyzine has controlled his symptoms in the past and would like to use prn hydroxyzine. Rx'd as below    -     hydrOXYzine HCL (ATARAX) 10 MG Tab; Take 1-2 pills as needed for hives or itching  Dispense: 180 tablet; Refill: 0  -     Ambulatory referral/consult to Allergy; Future; Expected date: 07/31/2024    Multiple benign nevi  Seborrheic keratoses  Lentigo  Suarez angioma  Reassurance given to patient. No treatment is necessary.   Treatment of benign, asymptomatic lesions may be considered cosmetic.    Screening exam for skin cancer  History of nonmelanoma skin cancer  Area(s) of previous NMSC evaluated with no signs of recurrence.    Upper body skin examination performed today including at least 6 points as noted in physical examination. No lesions suspicious for malignancy noted.    Recommend daily sun protection/avoidance and  use of at least SPF 30, broad spectrum sunscreen (OTC drug).      Follow up in about 1 year (around 7/24/2025) for TBSE.

## 2024-12-05 ENCOUNTER — OFFICE VISIT (OUTPATIENT)
Dept: PSYCHIATRY | Facility: CLINIC | Age: 44
End: 2024-12-05
Payer: COMMERCIAL

## 2024-12-05 VITALS
DIASTOLIC BLOOD PRESSURE: 74 MMHG | SYSTOLIC BLOOD PRESSURE: 114 MMHG | BODY MASS INDEX: 26.36 KG/M2 | HEIGHT: 73 IN | WEIGHT: 198.88 LBS | HEART RATE: 63 BPM

## 2024-12-05 DIAGNOSIS — F51.01 PRIMARY INSOMNIA: ICD-10-CM

## 2024-12-05 DIAGNOSIS — F43.22 ADJUSTMENT DISORDER WITH ANXIETY: Primary | ICD-10-CM

## 2024-12-05 PROCEDURE — 3078F DIAST BP <80 MM HG: CPT | Mod: CPTII,S$GLB,, | Performed by: PSYCHIATRY & NEUROLOGY

## 2024-12-05 PROCEDURE — 3044F HG A1C LEVEL LT 7.0%: CPT | Mod: CPTII,S$GLB,, | Performed by: PSYCHIATRY & NEUROLOGY

## 2024-12-05 PROCEDURE — 3074F SYST BP LT 130 MM HG: CPT | Mod: CPTII,S$GLB,, | Performed by: PSYCHIATRY & NEUROLOGY

## 2024-12-05 PROCEDURE — 90792 PSYCH DIAG EVAL W/MED SRVCS: CPT | Mod: S$GLB,,, | Performed by: PSYCHIATRY & NEUROLOGY

## 2024-12-05 PROCEDURE — 99999 PR PBB SHADOW E&M-EST. PATIENT-LVL II: CPT | Mod: PBBFAC,,, | Performed by: PSYCHIATRY & NEUROLOGY

## 2024-12-05 RX ORDER — ZALEPLON 10 MG/1
10 CAPSULE ORAL NIGHTLY
Qty: 30 CAPSULE | Refills: 1 | Status: SHIPPED | OUTPATIENT
Start: 2024-12-05
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
## 2025-01-03 ENCOUNTER — OFFICE VISIT (OUTPATIENT)
Dept: PSYCHIATRY | Facility: CLINIC | Age: 45
End: 2025-01-03
Payer: COMMERCIAL

## 2025-01-03 DIAGNOSIS — F51.01 PRIMARY INSOMNIA: ICD-10-CM

## 2025-01-03 DIAGNOSIS — F43.22 ADJUSTMENT DISORDER WITH ANXIETY: Primary | ICD-10-CM
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
## 2025-02-13 ENCOUNTER — OFFICE VISIT (OUTPATIENT)
Dept: DERMATOLOGY | Facility: CLINIC | Age: 45
End: 2025-02-13
Payer: COMMERCIAL

## 2025-02-13 DIAGNOSIS — D48.5 NEOPLASM OF UNCERTAIN BEHAVIOR OF SKIN: Primary | ICD-10-CM

## 2025-02-13 PROCEDURE — 99999 PR PBB SHADOW E&M-EST. PATIENT-LVL III: CPT | Mod: PBBFAC,,, | Performed by: STUDENT IN AN ORGANIZED HEALTH CARE EDUCATION/TRAINING PROGRAM

## 2025-02-13 PROCEDURE — 99499 UNLISTED E&M SERVICE: CPT | Mod: S$GLB,,, | Performed by: STUDENT IN AN ORGANIZED HEALTH CARE EDUCATION/TRAINING PROGRAM

## 2025-02-13 PROCEDURE — 54100 BIOPSY OF PENIS: CPT | Mod: S$GLB,,, | Performed by: STUDENT IN AN ORGANIZED HEALTH CARE EDUCATION/TRAINING PROGRAM

## 2025-02-13 NOTE — PROGRESS NOTES
Subjective:      Patient ID:  Santy Angeles is a 44 y.o. male who presents for   Chief Complaint   Patient presents with    Spot     Penis      Pt is here for spot on penis   Pt states he has h/o skin cancer on penis , unsure of which type     Spot      Patient deferred TBSE today. He would just like a spot on penis checked. Present for years but chronically becomes inflamed. He is applying imiquimod currently    Review of Systems   Skin:  Negative for itching, rash and dry skin.       Objective:   Physical Exam   Constitutional: He appears well-developed and well-nourished. No distress.   Genitourinary:         Neurological: He is alert and oriented to person, place, and time. He is not disoriented.   Psychiatric: He has a normal mood and affect.   Skin:   Areas Examined (abnormalities noted in diagram):   Genitals / Buttocks / Groin Inspection Performed       Diagram Legend     Erythematous scaling macule/papule c/w actinic keratosis       Vascular papule c/w angioma      Pigmented verrucoid papule/plaque c/w seborrheic keratosis      Yellow umbilicated papule c/w sebaceous hyperplasia      Irregularly shaped tan macule c/w lentigo     1-2 mm smooth white papules consistent with Milia      Movable subcutaneous cyst with punctum c/w epidermal inclusion cyst      Subcutaneous movable cyst c/w pilar cyst      Firm pink to brown papule c/w dermatofibroma      Pedunculated fleshy papule(s) c/w skin tag(s)      Evenly pigmented macule c/w junctional nevus     Mildly variegated pigmented, slightly irregular-bordered macule c/w mildly atypical nevus      Flesh colored to evenly pigmented papule c/w intradermal nevus       Pink pearly papule/plaque c/w basal cell carcinoma      Erythematous hyperkeratotic cursted plaque c/w SCC      Surgical scar with no sign of skin cancer recurrence      Open and closed comedones      Inflammatory papules and pustules      Verrucoid papule consistent consistent with wart      Erythematous eczematous patches and plaques     Dystrophic onycholytic nail with subungual debris c/w onychomycosis     Umbilicated papule    Erythematous-base heme-crusted tan verrucoid plaque consistent with inflamed seborrheic keratosis     Erythematous Silvery Scaling Plaque c/w Psoriasis     See annotation      Assessment / Plan:      Pathology Orders:       Normal Orders This Visit    Specimen to Pathology, Dermatology     Questions:    Procedure Type: Dermatology and skin neoplasms    Number of Specimens: 1    ------------------------: -------------------------    Spec 1 Procedure: Biopsy    Spec 1 Clinical Impression: chronic nonspecific lesion on penis. He has h/o SCCis on penis so biopsy to r/o SCCis    Spec 1 Source: dorsal penile shaft    Release to patient: Immediate    Release to patient:           Neoplasm of uncertain behavior of skin  -     Specimen to Pathology, Dermatology    Shave biopsy procedure note:    Shave biopsy performed after verbal consent including risk of infection, scar, recurrence, need for additional treatment of site. Area prepped with alcohol, anesthetized with approximately 1.0cc of 1% lidocaine with epinephrine. Lesional tissue shaved with razor blade. Hemostasis achieved with application of aluminum chloride followed by hyfrecation. No complications. Dressing applied. Wound care explained.           Follow up in about 1 year (around 2/13/2026) for TBSE.

## 2025-02-13 NOTE — PATIENT INSTRUCTIONS
Shave Biopsy Wound Care    Your doctor has performed a shave biopsy today.  A band aid and vaseline ointment has been placed over the site.  This should remain in place for NO LONGER THAN 48 hours.  It is fine to remove the bandaid after 24 hours, if the area is no longer bleeding. It is recommended that you keep the area dry (do not wet)) for the first 24 hours.  After 24 hours, wash the area with warm soap and water and apply Vaseline jelly.  Many patients prefer to use Neosporin or Bacitracin ointment.  This is acceptable; however, know that you can develop an allergy to this medication even if you have used it safely for years.  It is important to keep the area moist.  Letting it dry out and get air slows healing time, and will worsen the scar.        If you notice increasing redness, tenderness, pain, or yellow drainage at the biopsy site, please notify your doctor.  These are signs of an infection.    If your biopsy site is bleeding, apply firm pressure for 15 minutes straight.  Repeat for another 15 minutes, if it is still bleeding.   If the surgical site continues to bleed, then please contact your doctor.      For [facility] users:   You will receive your biopsy results in [facility] as soon as they are available. Please be assured that your physician/provider will review your results and will then determine what further treatment, evaluation, or planning is required. You should be contacted by your physician's/provider's office within 5 business days of receiving your results; If not, please reach out to directly. This is one more way [email or web address] is putting you first.     [facility] [place] [address]/ [phone] FAX/ [email or web address]

## 2025-02-21 ENCOUNTER — RESULTS FOLLOW-UP (OUTPATIENT)
Dept: DERMATOLOGY | Facility: CLINIC | Age: 45
End: 2025-02-21

## 2025-05-07 NOTE — PROGRESS NOTES
"DATE: 5/16/2025  PATIENT: Santy Angeles    Supervising Physician: Sony Hunt M.D.    CHIEF COMPLAINT: neck and bilateral shoulder pain    HISTORY:  Santy Angeles is a 45 y.o. male here for initial evaluation of neck and bilateral shoulder pain (Neck - 0, Arm - 0). The pain has been present for years, worsening recently. The patient describes the pain as stiffness and aching. The pain is worse with head movements and improved by nothing. There is positive new associated numbness and tingling in his hands. There is negative subjective weakness. Prior treatments have included chiropractic rx, home exercises for 8 weeks in the last 3 months, ice, and remote hx of TPIs, but no ESIs or surgery.     The patient denies myelopathic symptoms such as handwriting changes or difficulty with buttons/coins/keys. Denies perineal paresthesias, bowel/bladder dysfunction.    PAST MEDICAL/SURGICAL HISTORY:  History reviewed. No pertinent past medical history.  History reviewed. No pertinent surgical history.    Medications:  Medications Ordered Prior to Encounter[1]    Social History: Social History[2]    REVIEW OF SYSTEMS:  Constitution: Negative. Negative for chills, fever and night sweats.   Cardiovascular: Negative for chest pain and syncope.   Respiratory: Negative for cough and shortness of breath.   Gastrointestinal: See HPI. Negative for nausea/vomiting. Negative for abdominal pain.  Genitourinary: See HPI. Negative for discoloration or dysuria.  Skin: Negative for dry skin, itching and rash.   Hematologic/Lymphatic: Negative for bleeding problem. Does not bruise/bleed easily.   Musculoskeletal: Negative for falls and muscle weakness.   Neurological: See HPI. No seizures.   Endocrine: Negative for polydipsia, polyphagia and polyuria.   Allergic/Immunologic: Negative for hives and persistent infections.  Psychiatric/Behavioral: Negative for depression and insomnia.         EXAM:  Ht 6' 1" (1.854 m)   Wt 91.7 kg (202 lb 0.8 oz) "   BMI 26.66 kg/m²     General: The patient is a very pleasant 45 y.o. male in no apparent distress, the patient is oriented to person, place and time.  Psych: Normal mood and affect  HEENT: Vision grossly intact, hearing intact to the spoken word.  Lungs: Respirations unlabored.  Gait: Normal station and gait, no difficulty with toe or heel walk.   Skin: Cervical skin negative for rashes, lesions, hairy patches and surgical scars.  Range of motion: Cervical range of motion is acceptable. There is mild tenderness to palpation.  Spinal Balance: Global saggital and coronal spinal balance acceptable, no significant for scoliosis and kyphosis.  Musculoskeletal: No pain with the range of motion of the bilateral shoulders and elbows. Normal bulk and contour of the bilateral hands.  Vascular: Bilateral hands warm and well perfused, radial pulses 2+ bilaterally.  Neurological: Normal strength and tone in all major motor groups in the bilateral upper and lower extremities. Normal sensation to light touch in the C5-T1 and L2-S1 dermatomes bilaterally.  Deep tendon reflexes symmetric 2+ in the bilateral upper and lower extremities.  Negative Inverted Radial Reflex and Bolivar's bilaterally. Negative Babinski bilaterally.     IMAGING:   Today I personally reviewed AP, Lat and Flex/Ex  upright C-spine films that demonstrate mild degenerative changes at C6-7     Body mass index is 26.66 kg/m².    Hemoglobin A1C   Date Value Ref Range Status   08/21/2024 5.5 4.7 - 5.6 % Final   06/24/2023 5.1 4.0 - 5.6 % Final     Comment:     ADA Screening Guidelines:  5.7-6.4%  Consistent with prediabetes  >or=6.5%  Consistent with diabetes    High levels of fetal hemoglobin interfere with the HbA1C  assay. Heterozygous hemoglobin variants (HbS, HgC, etc)do  not significantly interfere with this assay.   However, presence of multiple variants may affect accuracy.       % HEMOGLOBIN A1C   Date Value Ref Range Status   02/08/2024 5.5 <5.7 % of  total Hgb Final     Comment:     For the purpose of screening for the presence of  diabetes:    <5.7%       Consistent with the absence of diabetes  5.7-6.4%    Consistent with increased risk for diabetes              (prediabetes)  > or =6.5%  Consistent with diabetes    This assay result is consistent with a decreased risk  of diabetes.    Currently, no consensus exists regarding use of  hemoglobin A1c for diagnosis of diabetes in children.    According to American Diabetes Association (ADA)  guidelines, hemoglobin A1c <7.0% represents optimal  control in non-pregnant diabetic patients. Different  metrics may apply to specific patient populations.   Standards of Medical Care in Diabetes(ADA).      HbA1c performed on Roche platform.  Effective 11/13/23 a change in test platforms may have   shifted HbA1c results compared to historical results.               ASSESSMENT/PLAN:    Santy was seen today for neck pain.    Diagnoses and all orders for this visit:    DDD (degenerative disc disease), cervical  -     X-Ray Cervical Spine AP And Lateral; Future  -     MRI Cervical Spine Without Contrast; Future    Other orders  -     Discontinue: methocarbamoL (ROBAXIN) 750 MG Tab; Take 1 tablet (750 mg total) by mouth nightly as needed.  -     methocarbamoL (ROBAXIN) 750 MG Tab; Take 1 tablet (750 mg total) by mouth nightly as needed.        Today we discussed at length all of the different treatment options including anti-inflammatories, acetaminophen, rest, ice, heat, physical therapy including strengthening and stretching exercises, home exercises, ROM, aerobic conditioning, aqua therapy, other modalities including ultrasound, massage, and dry needling, epidural steroid injections and finally surgical intervention.      Pt presents with chronic neck pain and radiculopathy. Will obtain MRI to further evaluate and send robaxin to pharmacy.            [1]   Current Outpatient Medications on File Prior to Visit   Medication  "Sig Dispense Refill    hydrOXYzine HCL (ATARAX) 10 MG Tab Take 1-2 pills as needed for hives or itching 180 tablet 0    metroNIDAZOLE (METROGEL) 0.75 % gel Apply topically 2 (two) times daily. 45 g 10    needle, disp, 18 G (HYPODERMIC NEEDLES) 18 gauge x 1 1/2" Ndle One needle every 1 weeks to draw up medication 15 each 6    needle, disp, 21 G (HYPODERMIC NEEDLES) 21 gauge x 1 1/2" Ndle One needle every 1 weeks to Inject medication 15 each 6    selenium sulfide 2.5 % Lotn Apply 1 application  topically once daily. 118 mL 10    zaleplon (SONATA) 10 MG capsule Take 1 capsule (10 mg total) by mouth every evening. 30 capsule 1    [DISCONTINUED] rOPINIRole (REQUIP) 0.25 MG tablet Take 1 tablet by mouth nightly 30 tablet 1     No current facility-administered medications on file prior to visit.   [2]   Social History  Socioeconomic History    Marital status:    Tobacco Use    Smoking status: Never    Smokeless tobacco: Never   Substance and Sexual Activity    Alcohol use: Yes    Drug use: Never     Social Drivers of Health     Financial Resource Strain: Patient Declined (5/14/2025)    Overall Financial Resource Strain (CARDIA)     Difficulty of Paying Living Expenses: Patient declined   Food Insecurity: Patient Declined (5/14/2025)    Hunger Vital Sign     Worried About Running Out of Food in the Last Year: Patient declined     Ran Out of Food in the Last Year: Patient declined   Transportation Needs: Patient Declined (5/14/2025)    PRAPARE - Transportation     Lack of Transportation (Medical): Patient declined     Lack of Transportation (Non-Medical): Patient declined   Physical Activity: Sufficiently Active (5/14/2025)    Exercise Vital Sign     Days of Exercise per Week: 4 days     Minutes of Exercise per Session: 60 min   Stress: Patient Declined (5/14/2025)    [employer] - Occupational Stress Questionnaire     Feeling of Stress : Patient declined   Housing Stability: Patient " Declined (5/14/2025)    Housing Stability Vital Sign     Unable to Pay for Housing in the Last Year: Patient declined     Homeless in the Last Year: Patient declined

## 2025-05-15 ENCOUNTER — OFFICE VISIT (OUTPATIENT)
Dept: SPINE | Facility: CLINIC | Age: 45
End: 2025-05-15
Payer: COMMERCIAL

## 2025-05-15 ENCOUNTER — HOSPITAL ENCOUNTER (OUTPATIENT)
Dept: RADIOLOGY | Facility: HOSPITAL | Age: 45
Discharge: HOME | End: 2025-05-15
Attending: ORTHOPAEDIC SURGERY
Payer: COMMERCIAL

## 2025-05-15 VITALS — WEIGHT: 202.06 LBS | HEIGHT: 73 IN | BODY MASS INDEX: 26.78 KG/M2

## 2025-05-15 DIAGNOSIS — M50.30 DDD (DEGENERATIVE DISC DISEASE), CERVICAL: Primary | ICD-10-CM

## 2025-05-15 DIAGNOSIS — M50.30 DDD (DEGENERATIVE DISC DISEASE), CERVICAL: ICD-10-CM

## 2025-05-15 PROCEDURE — 99204 OFFICE O/P NEW MOD 45 MIN: CPT | Mod: S$GLB,,, | Performed by: ORTHOPAEDIC SURGERY

## 2025-05-15 PROCEDURE — 72040 X-RAY EXAM NECK SPINE 2-3 VW: CPT | Mod: 26,,, | Performed by: RADIOLOGY

## 2025-05-15 PROCEDURE — 1159F MED LIST DOCD IN RCRD: CPT | Mod: CPTII,S$GLB,, | Performed by: ORTHOPAEDIC SURGERY

## 2025-05-15 PROCEDURE — 72040 X-RAY EXAM NECK SPINE 2-3 VW: CPT | Mod: TC

## 2025-05-15 PROCEDURE — 3008F BODY MASS INDEX DOCD: CPT | Mod: CPTII,S$GLB,, | Performed by: ORTHOPAEDIC SURGERY

## 2025-05-15 PROCEDURE — 99999 PR PBB SHADOW E&M-EST. PATIENT-LVL III: CPT | Mod: PBBFAC,,, | Performed by: ORTHOPAEDIC SURGERY

## 2025-05-15 RX ORDER — METHOCARBAMOL 750 MG/1
750 TABLET, FILM COATED ORAL NIGHTLY PRN
Qty: 30 TABLET | Refills: 0 | Status: SHIPPED | OUTPATIENT
Start: 2025-05-15 | End: 2025-05-15

## 2025-05-15 RX ORDER — METHOCARBAMOL 750 MG/1
750 TABLET, FILM COATED ORAL NIGHTLY PRN
Qty: 30 TABLET | Refills: 0 | Status: SHIPPED | OUTPATIENT
Start: 2025-05-15 | End: 2025-06-14

## 2025-05-20 ENCOUNTER — CLINICAL SUPPORT (OUTPATIENT)
Dept: REHABILITATION | Facility: HOSPITAL | Age: 45
End: 2025-05-20
Payer: COMMERCIAL

## 2025-05-20 DIAGNOSIS — M54.2 NECK PAIN: Primary | ICD-10-CM

## 2025-05-20 PROCEDURE — 97161 PT EVAL LOW COMPLEX 20 MIN: CPT

## 2025-05-20 PROCEDURE — 97110 THERAPEUTIC EXERCISES: CPT

## 2025-05-20 NOTE — PATIENT INSTRUCTIONS
Access Code: [email or web address]  URL: [email or web address]  Date: 05/20/2025  Prepared by: Brian Oden    Exercises  - Seated Cervical Retraction  - 2 x daily - 7 x weekly - 2 sets - 10 reps  - Seated Upper Trapezius Stretch  - 2 x daily - 7 x weekly - 4 sets - 30s hold  - Seated Levator Scapulae Stretch  - 2 x daily - 7 x weekly - 4 sets - 30 seconds hold  - Shoulder External Rotation and Scapular Retraction with Resistance  - 2 x daily - 7 x weekly - 3 sets - 10 reps  - Standing Shoulder Horizontal Abduction with Resistance  - 2 x daily - 7 x weekly - 3 sets - 10 reps  - Seated Assisted Cervical Rotation with Towel  - 2 x daily - 7 x weekly - 2 sets - 10 reps  - Thoracic Extension Mobilization on Foam Roll  - 2 x daily - 7 x weekly - 2 sets - 10 reps - 3s hold

## 2025-05-20 NOTE — LETTER
May 20, 2025  Aaarefodetteal Self    To whom it may concern,     The attached plan of care is being sent to you for review and reference.    You may indicate your approval by signing the document electronically, or by faxing/mailing a signed copy of the final page of this document back to the attention of Brian Oden PT, DPT:         Plan of Care 5/20/25   Effective from: 5/20/2025  Effective to: 6/20/2025    Plan ID: [id number]            Participants as of Finalize on 5/20/2025    Name Type Comments Contact Info    [facility]      Brian Oden PT, DPT Physical Therapist         Last Plan Note     No notes of the expected types and statuses found.        Current Participants as of 5/20/2025    Name Type Comments Contact Info    [facility] Self Referring Provider      Signature pending    Brian Oden PT, DPT Physical Therapist      Electronically signed by Brian Oden PT, DPT at 5/20/2025 1633 CDT            Sincerely,      Brian Oden PT, DPT  [facility]                                                            Dear Brian Oden PT, DPT,    RE: Mr. Santy Angeles, MRN: [id number]    I certify that I have reviewed the attached plan of care and agree to the details within.        ___________________________  ___________________________  Provider Printed Name   Provider Signed Name      ___________________________  Date and Time

## 2025-05-20 NOTE — PROGRESS NOTES
Outpatient Rehab    Physical Therapy Evaluation    Patient Name: Santy Angeles  MRN: [id number]  YOB: 1980  Encounter Date: 5/20/2025    Therapy Diagnosis:   Encounter Diagnosis   Name Primary?    Neck pain Yes     Physician: Self, Aaareferral    Physician Orders: Eval and Treat  Medical Diagnosis: Neck pain  Shoulder pain    Visit # / Visits Authorized:  1 / 1  Insurance Authorization Period: 5/14/2025 to 12/31/2025  Date of Evaluation: 5/20/2025  Plan of Care Certification: 5/20/2025 to 7/20/2026     Time In: 1400   Time Out: 1455  Total Time (in minutes): 55   Total Billable Time (in minutes): 55    Intake Outcome Measure for FOTO Survey    Therapist reviewed FOTO scores for Santy Angeles on 5/20/2025.   FOTO report - see Media section or FOTO account episode details.     Intake Score:  %    Precautions:       Subjective   History of Present Illness  Santy is a 45 y.o. male who reports to physical therapy with a chief concern of Bilateral shoulder pain / neck stiffness / tingling in hands.                 History of Present Condition/Illness: Patient reports 2 weeks ago he started to get bilateral shoulder pain in the front of the shoulder, difficulty with overhead movements of shoulder, tingling in both hands. Patient reports he was having pain with chest pressing motions, reaching overhead, and pain at night when laying on his shoulders. Patient reports his shoulders need to be in a neutral position to be comfortable in bed. Patient reports he is going to the chiropractor 1x a week. Patient reports they were doing needling and decompression therapy. Patient reports the needling has helped. Patient reports taking Meloxicam and methocarbamol.     Activities of Daily Living  Social history was obtained from Patient.    General Prior Level of Function Comments: Working out upper body 4-5x a week  General Current Level of Function Comments: Not working out anymore upper body       Previously  independent with activities of daily living? Yes     Currently independent with activities of daily living? Yes          Previously independent with instrumental activities of daily living? Yes     Currently independent with instrumental activities of daily living? Yes              Pain     Patient reports a current pain level of 0/10. Pain at best is reported as 0/10. Pain at worst is reported as 9/10.   Location: Neck tightness and anterior shoulder  Clinical Progression (since onset): Improved  Pain Qualities: Tightness, Other (Comment)  Other Pain Qualities: tightness  Pain-Relieving Factors: Ice, Change in position, Movement, Medications - prescription  Pain-Aggravating Factors: Other (Comment)  Other Pain-Aggravating Factors: Increased activity, sitting and driving         Employment  Employment Status: Employed full-time   Sitting and office work       Past Medical History/Physical Systems Review:   Santy Angeles  has no past medical history on file.    Santy Angeles  has no past surgical history on file.    Santy has a current medication list which includes the following prescription(s): hydroxyzine hcl, methocarbamol, metronidazole, hypodermic needles, hypodermic needles, selenium sulfide, zaleplon, and [DISCONTINUED] ropinirole.    Review of patient's allergies indicates:   Allergen Reactions    Amoxicillin     Ampicillin     Penicillins         Objective   Posture  Patient presents with a Forward head position.     Shoulders are Rounded.             Spinal Mobility  Hypomobile: Cervical           Subcranial Range of Motion   Active Restricted? Passive Restricted? Pain   Flexion         Protraction         Retraction           Cervical Range of Motion   Active (deg) Passive (deg) Pain   Flexion 50       Extension 55 (Soreness)       Right Lateral Flexion 40       Right Rotation  (25% limited)       Left Lateral Flexion 30       Left Rotation  (25% limited)              Shoulder Range of Motion  Right  Shoulder   Active (deg) Passive (deg) Pain   Flexion 180 180     Extension 20 20     Scaption         ABduction 180 180     ADduction         Horizontal ABduction         Horizontal ADduction         External Rotation (Shoulder ABducted 0 degrees) 40 45     External Rotation (Shoulder ABducted 45 degrees) 80 85     External Rotation (Shoulder ABducted 90 degrees)         Internal Rotation (Shoulder ABducted 0 degrees)         Internal Rotation (Shoulder ABducted 45 degrees)         Internal Rotation (Shoulder ABducted 90 degrees) 90 90       Left Shoulder   Active (deg) Passive (deg) Pain   Flexion 180 180     Extension 20 20     Scaption         ABduction 180 180     ADduction         Horizontal ABduction         Horizontal ADduction         External Rotation (Shoulder ABducted 0 degrees) 40 45     External Rotation (Shoulder ABducted 45 degrees) 80 85     External Rotation (Shoulder ABducted 90 degrees)         Internal Rotation (Shoulder ABducted 0 degrees)         Internal Rotation (Shoulder ABducted 45 degrees)         Internal Rotation (Shoulder ABducted 90 degrees) 90 90              Elbow/Forearm Range of Motion   Right Elbow/Forearm   Active (deg) Passive (deg) Pain   Flexion 120 120     Extension 0 0     Forearm Pronation         Forearm Supination           Left Elbow/Forearm   Active (deg) Passive (deg) Pain   Flexion 120 120     Extension 0 0     Forearm Pronation         Forearm Supination                       Shoulder Strength - Planes of Motion   Right Strength Right Pain Left Strength Left  Pain   Flexion 5   5     Extension           ABduction 5   5     ADduction           Horizontal ABduction           Horizontal ADduction           Internal Rotation 0° 5   5     Internal Rotation 90°           External Rotation 0° 5   4+ Yes   External Rotation 90°                              Cervical Screen  Tests  Positive: Right Distraction and Left Distraction  Negative: Right Spurling's A, Left Spurling's  A, Right Spurling's B, and Left Spurling's B  Cervical Range of Motion           Thoracic Range of Motion             Cervical/Thoracic Special Tests            Cervical Tests  Positive: Right Distraction and Left Distraction  Negative: Right Spurling's A and Left Spurling's A  Positive: Right ULTT1 (Median) and Left ULTT1 (Median)  Negative: Right Spurling's B and Left Spurling's B           Shoulder Special Tests  Shoulder Neural Tension Tests  Positive: Right ULTT1 (Median) and Left ULTT1 (Median)       Elbow Special Tests  Elbow Neural Tension Tests  Positive: Right ULTT1 (Median) and Left ULTT1 (Median)       Wrist/Hand Special Tests  Upper Limb Tension Tests  Positive: Right ULTT1 (Median) and Left ULTT1 (Median)                Treatment:     THERAPEUTIC EXERCISES to develop strength, endurance, ROM, and flexibility for 25 minutes including    Seated Cervical Retraction  2 sets - 10 reps  Seated Upper Trapezius Stretch  4 sets - 30s hold  Seated Levator Scapulae Stretch  4 sets - 30 seconds hold  Shoulder External Rotation and Scapular Retraction with Resistance 3 sets - 10 reps  Standing Shoulder Horizontal Abduction with Resistance  3 sets - 10 reps  Seated Assisted Cervical Rotation with Towel  2 sets - 10 reps  Thoracic Extension Mobilization on Foam Roll  2 sets - 10 reps - 3s hold    MANUAL THERAPY TECHNIQUES were applied to neck for 0 minutes including:        NEUROMUSCULAR RE-EDUCATION ACTIVITIES to improve Balance, Coordination, Proprioception, Posture, and Motor Control for 0 minutes.  The following were included:        THERAPEUTIC ACTIVITIES to improve dynamic and functional performance for 0 minutes including        Time Entry(in minutes):  PT Evaluation (Low) Time Entry: 30  Therapeutic Exercise Time Entry: 25    Assessment & Plan   Assessment  Santy presents with a condition of Low complexity.   Presentation of Symptoms: Stable  Will Comorbidities Impact Care: No       Functional Limitations:  Participating in leisure activities  Impairments: Impaired physical strength, Pain with functional activity  Personal Factors Affecting Prognosis: Pain    Patient Goal for Therapy (PT): Improve neck stiffness  Prognosis: Good  Assessment Details: Patient presents to PT with a chronic history of neck pain and a 2 weeks history of shoulder pain with radicular symptoms in the hands bilaterally. Symptoms have improved since onset of pain however. Patient presents with tightness in cervical spine, decrease neck range of motion, and poor posture. Patient was progressed with exercise today to address cervical range of motion and posture. Patient did not want to establish a schedule at this time and opted for a home program. Patient is going to check in with PT as needed. Patient heavily educated to stop HEP if painful.     Plan  From a physical therapy perspective, the patient would benefit from: Skilled Rehab Services    Planned therapy interventions include: Therapeutic exercise, Therapeutic activities, Neuromuscular re-education, Manual therapy, and ADLs/IADLs.    Planned modalities to include: Diathermy, Electrical stimulation - attended, and Electrical stimulation - passive/unattended.        Visit Frequency: 2 times In Total for 6 Weeks.       This plan was discussed with Patient.   Discussion participants: Agreed Upon Plan of Care  Plan details: Patient will return to PT when needed. He would like to try HEP on his own before returning          Patient's spiritual, cultural, and educational needs considered and patient agreeable to plan of care and goals.     Education  Education was done with Patient. The patient's learning style includes Demonstration, Listening, and Pictures/video. The patient Demonstrates understanding and Verbalizes understanding.         Educated patient on patient diagnosis and prognosis. Educated patient on home program and expectations with physical therapy.       Goals:   Active        Functional outcome       Patient will show a significant change in FOTO patient-reported outcome tool to demonstrate subjective improvement       Start:  05/20/25    Expected End:  07/20/25            Patient stated goal: Improve neck stiffness        Start:  05/20/25    Expected End:  07/20/25            Patient will demonstrate independence in home program for support of progression       Start:  05/20/25    Expected End:  07/20/25               Pain       Patient will report pain of 0/10 demonstrating a reduction of overall pain       Start:  05/20/25    Expected End:  07/20/25               Range of Motion       Patient will achieve left cervical side bending ROM to = right SB       Start:  05/20/25    Expected End:  07/20/25            Patient will achieve cervical flexion ROM >50 degrees       Start:  05/20/25    Expected End:  07/20/25            Patient will achieve cervical extension ROM > 50  degrees       Start:  05/20/25    Expected End:  07/20/25                Brian Oden PT, DPT

## 2025-05-27 ENCOUNTER — TELEPHONE (OUTPATIENT)
Dept: ORTHOPEDICS | Facility: CLINIC | Age: 45
End: 2025-05-27
Payer: COMMERCIAL

## 2025-06-13 DIAGNOSIS — G25.81 RESTLESS LEG SYNDROME: Primary | ICD-10-CM

## 2025-06-13 RX ORDER — TEMAZEPAM 7.5 MG/1
7.5 CAPSULE ORAL NIGHTLY
COMMUNITY
End: 2025-06-13 | Stop reason: SDUPTHER

## 2025-06-16 RX ORDER — TEMAZEPAM 7.5 MG/1
7.5 CAPSULE ORAL NIGHTLY
Qty: 30 CAPSULE | Refills: 2 | Status: SHIPPED | OUTPATIENT
Start: 2025-06-16 | End: 2025-06-20 | Stop reason: SDUPTHER

## 2025-06-19 ENCOUNTER — OFFICE VISIT (OUTPATIENT)
Facility: CLINIC | Age: 45
End: 2025-06-19
Payer: COMMERCIAL

## 2025-06-19 VITALS
DIASTOLIC BLOOD PRESSURE: 83 MMHG | HEART RATE: 69 BPM | HEIGHT: 73 IN | SYSTOLIC BLOOD PRESSURE: 121 MMHG | BODY MASS INDEX: 26.66 KG/M2

## 2025-06-19 DIAGNOSIS — G25.81 RESTLESS LEG SYNDROME: ICD-10-CM

## 2025-06-19 PROCEDURE — 3008F BODY MASS INDEX DOCD: CPT | Mod: CPTII,S$GLB,, | Performed by: NEUROLOGICAL SURGERY

## 2025-06-19 PROCEDURE — 99999 PR PBB SHADOW E&M-EST. PATIENT-LVL III: CPT | Mod: PBBFAC,,, | Performed by: NEUROLOGICAL SURGERY

## 2025-06-19 PROCEDURE — 3079F DIAST BP 80-89 MM HG: CPT | Mod: CPTII,S$GLB,, | Performed by: NEUROLOGICAL SURGERY

## 2025-06-19 PROCEDURE — 3074F SYST BP LT 130 MM HG: CPT | Mod: CPTII,S$GLB,, | Performed by: NEUROLOGICAL SURGERY

## 2025-06-19 PROCEDURE — 1159F MED LIST DOCD IN RCRD: CPT | Mod: CPTII,S$GLB,, | Performed by: NEUROLOGICAL SURGERY

## 2025-06-19 PROCEDURE — 99214 OFFICE O/P EST MOD 30 MIN: CPT | Mod: S$GLB,,, | Performed by: NEUROLOGICAL SURGERY

## 2025-06-19 RX ORDER — MELOXICAM 7.5 MG/1
7.5 TABLET ORAL
COMMUNITY

## 2025-06-19 NOTE — PROGRESS NOTES
Name: Santy Angeles  MRN: [id number]   CSN: [id number]      Date: 06/20/2025      History of Present Illness    CHIEF COMPLAINT:  Mr. Angeles presents today for medication refills    SLEEP AND RESTLESS LEG SYNDROME:  He takes Ropinirole at a low dose for restless leg syndrome. He uses Temazepam 7.5mg and recently went 3 days without it while maintaining sleep. He takes magnesium glycinate with possible minimal benefit for symptoms.    LIFESTYLE:  He reports significant coffee dependence with unsuccessful cessation attempts, but intends to gradually reduce caffeine intake. He is working to establish a consistent exercise routine.    PAST MEDICAL HISTORY:  He has a history of sciatic pain that resolved after three acupuncture sessions with no recurrence.      ROS:  General: -fever, -chills, -fatigue, -weight gain, -weight loss  Eyes: -vision changes, -redness, -discharge  ENT: -ear pain, -nasal congestion, -sore throat  Cardiovascular: -chest pain, -palpitations, -lower extremity edema  Respiratory: -cough, -shortness of breath  Gastrointestinal: -abdominal pain, -nausea, -vomiting, -diarrhea, -constipation, -blood in stool  Genitourinary: -dysuria, -hematuria, -frequency  Musculoskeletal: -joint pain, -muscle pain  Skin: -rash, -lesion  Neurological: -headache, -dizziness, -numbness, -tingling, +restless legs  Psychiatric: -anxiety, -depression, +sleep difficulty              Past Medical History: The patient  has no past medical history on file.    Social History: The patient  reports that he has never smoked. He has never used smokeless tobacco. He reports current alcohol use. He reports that he does not use drugs.    Family History: Their family history is not on file.    Allergies: Amoxicillin, Ampicillin, and Penicillins     Meds: Scheduled Meds:  Continuous Infusions:  PRN Meds:.    Exam:  Physical Exam    General: No acute distress. Well-developed. Well-nourished.  Eyes: EOMI. Sclerae anicteric.  HENT:  "Normocephalic. Atraumatic. Nares patent. Moist oral mucosa.  Ears: Bilateral TMs clear. Bilateral EACs clear.  Cardiovascular: Regular rate. Regular rhythm. No murmurs. No rubs. No gallops. Normal S1, S2.  Respiratory: Normal respiratory effort. Clear to auscultation bilaterally. No rales. No rhonchi. No wheezing.  Abdomen: Soft. Non-tender. Non-distended. Normoactive bowel sounds.  Musculoskeletal: No  obvious deformity.  Extremities: No lower extremity edema.  Neurological: Alert & oriented x3. No slurred speech. Normal gait.  Psychiatric: Normal mood. Normal affect. Good insight. Good judgment.  Skin: Warm. Dry. No rash.          /83   Pulse 69   Ht 6' 1" (1.854 m)   BMI 26.66 kg/m²     Constitutional  Well-developed, well-nourished, appears stated age   Ophthalmoscopic  No papilledema with no hemorrhages or exudates bilaterally   Cardiovascular  Radial pulses 2+ and symmetric, no LE edema bilaterally   Neurological    * Mental status      - Orientation  Oriented to person, place, time, and situation     - Memory   Intact recent and remote     - Attention/concentration  Attentive, vigilant during exam     - Language  Naming & repetition intact, +2-step commands     - Fund of knowledge  Aware of current events     - Executive  Well-organized thoughts     - Other     * Cranial nerves       - CN II  PERRL, visual fields full to confrontation     - CN III, IV, VI  Extraocular movements full, normal pursuits and saccades     - CN V  Sensation V1 - V3 intact     - CN VII  Face strong and symmetric bilaterally     - CN VIII  Hearing intact bilaterally     - CN IX, X  Palate raises midline and symmetric     - CN XI  SCM and trapezius 5/5 bilaterally     - CN XII  Tongue midline   * Motor  Muscle bulk normal, strength 5/5 throughout   * Sensory   Intact to temperature and vibration throughout   * Coordination  No dysmetria with finger-to-nose or heel-to-shin   * Gait  See below.   * Deep tendon reflexes  2+ and " symmetric throughout   Babinski downgoing bilaterally     Laboratory/Radiological:Reviewed  - Results:  No visits with results within 1 Month(s) from this visit.   Latest known visit with results is:   Office Visit on 02/13/2025   Component Date Value Ref Range Status    Final Pathologic Diagnosis 02/13/2025    Corrected                    Value:1.  Skin, dorsal penile shaft, shave biopsy:  - BENIGN LICHENOID KERATOSIS, IRRITATED AND FOCALLY ERODED.        Supplemental Diagnosis 02/13/2025 Spirochete immunohistochemical stain is negative.  Appropriately reactive controls were reviewed.  The original diagnosis remains unchanged.   Corrected    Gross 02/13/2025    Corrected                    Value:Hospital/clinic label MRN:  [id number]  Pathology label MRN:  [id number]    The specimen is received in formalin labeled &quot;dorsal penile shaft&quot;. The specimen consists of a tan-brown of shave skin (0.7 x 0.3 x 0.1 cm). The skin is remarkable for a brown-black area of discoloration measuring 0.2 cm in length and measures   less than 0.1 from the nearest biopsy margin. The specimen is inked blue at the biopsy margin, bisected and submitted entirely in cassette [id number].    Naldo Quintanilla M.B.S  Grossing Technologist      Microscopic Exam 02/13/2025    Corrected                    Value:The sections show epidermal hyperkeratosis, acanthosis, and a marked interface lymphoid inflammatory infiltrate. In addition, there are numerous cytoid bodies and dyskeratotic cells at the dermal-epidermal junction and upper papillary dermis.  The   involved epidermis is focally ulcerated with associated fibrinoid necrosis, parakeratosis and neutrophils.  PAS stain is negative for fungi.  Ki 67 immunohistochemical stain shows an elevated proliferative index limited to the lower epidermal layers.    The lichenoid lymphocytic infiltrate is polyclonal.  A majority of the lymphocytes stain positive for both CD3 and CD4, with CD8  staining a slightly sparser population of cells.  The approximate CD4:CD8 ratio is 2:1.  CD20 immunohistochemical stain   highlights a few sparsely scattered plasma cells within the infiltrate, and CD 30 immunohistochemical stain highlights extremely rare positive lymphocytes within the infiltrate.  Appropriately reactive controls were reviewed.  Multiple levels we                          re   examined.  Spirochete immunohistochemical stain is pending, and the results will be reported in an addendum.      Disclaimer 02/13/2025 Unless the case is a 'gross only' or additional testing only, the final diagnosis for each specimen is based on a microscopic examination of appropriate tissue sections.   Corrected       - Independent review of images:        Assessment & Plan    IMPRESSION:  - Reviewed current medications and dosages.  - Increased ropinirole from 0.25 mg to 0.5 mg tablets, take 1 tablet daily with option to take 1.5 tablets if needed to potentially improve efficacy while maintaining a low dose relative to Parkinson's treatment.  - Evaluated ability to discontinue temazepam use, but decided to refill prescription for as-needed use.  - Assessed caffeine addiction and its impact on sleep.  - Advised on the pragmatic approach to trying safe, FDA-approved herbal supplements for symptom relief.    RESTLESS LEG SYNDROME:  - Explained that ropinirole is used for both restless leg syndrome and Parkinson's disease, with current dose being on the lower end of the spectrum.  - Discussed potential for tolerance development with nightly use.  - Increased ropinirole from 0.25 mg to 0.5 mg tablets, take 1 tablet daily with option to take 1.5 tablets if needed to potentially improve efficacy while maintaining a low dose relative to Parkinson's treatment.    CAFFEINE-RELATED DISORDERS:  - Informed about acute caffeine withdrawal symptoms and suggested alternatives like green tea or matcha containing theophylline.  - Mr. Angeles  to consider gradually reducing caffeine intake or switching to alternatives like green tea or matcha.    EXERCISE THERAPY:  - Mr. Angeles to continue routine exercise.    SLEEP DISORDERS:  - Refilled temazepam 7.5 mg, use sporadically as needed for sleep difficulties.    NUTRITIONAL SUPPLEMENTATION:  - Continued magnesium glycinate supplement.    FOLLOW-UP ENCOUNTER:  - Follow up in 6 months.  - Contact the office if needing to reschedule or if any issues arise before the next appointment.    PLAN SUMMARY:  - Continue magnesium glycinate supplement  - Increase ropinirole from 0.25 mg to 0.5 mg tablets, take 1 daily (option to take 1.5 if needed)  - Refill temazepam 7.5 mg for sporadic use as needed for sleep difficulties  - Consider gradually reducing caffeine intake or switching to alternatives like green tea or matcha  - Continue routine exercise  - Follow up in 6 months  - Contact office if needing to reschedule or if issues arise before next appointment          Risks/benefits, potential side effects of medications, and alternative therapies discussed as appropriate.    This note was generated with the assistance of ambient listening technology. Verbal consent was obtained by the patient and accompanying visitor(s) for the recording of patient appointment to facilitate this note. I attest to having reviewed and edited the generated note for accuracy, though some syntax or spelling errors may persist. Please contact the author of this note for any clarification.       ARI Rosario M.D.

## 2025-06-20 RX ORDER — ROPINIROLE 0.5 MG/1
0.5 TABLET, FILM COATED ORAL NIGHTLY
Qty: 30 TABLET | Refills: 11 | Status: SHIPPED | OUTPATIENT
Start: 2025-06-20 | End: 2026-06-20

## 2025-06-20 RX ORDER — TEMAZEPAM 7.5 MG/1
7.5 CAPSULE ORAL NIGHTLY
Qty: 30 CAPSULE | Refills: 2 | Status: SHIPPED | OUTPATIENT
Start: 2025-06-20